# Patient Record
Sex: FEMALE | Race: WHITE | URBAN - METROPOLITAN AREA
[De-identification: names, ages, dates, MRNs, and addresses within clinical notes are randomized per-mention and may not be internally consistent; named-entity substitution may affect disease eponyms.]

---

## 2021-10-15 ENCOUNTER — TELEPHONE (OUTPATIENT)
Dept: PAIN MANAGEMENT | Age: 76
End: 2021-10-15

## 2021-10-15 NOTE — TELEPHONE ENCOUNTER
BENEFITS: [facility]    Insurance: MMO  Phone: [phone]  Contact Name: Alexandrea Bahena  Effective Date: 1.1.2021     Plan year: YES-CALENDAR  Deductible: 1000.00      Deductible Met: 1000.00  Allowed/benefits paid at: 80% AFTER DEDUCTIBLE  OOP: 3000.00 MET $1725.44  Freq Limits: 00750 & 95886-BASED ON MEDICAL NECESSITY  Prior Auth Requirement: NO    Notes: NO PRE-EX CLAUSE    Call Reference #: [id number]    Time of call: 10:20AM

## 2021-10-21 ENCOUNTER — OFFICE VISIT (OUTPATIENT)
Dept: PAIN MANAGEMENT | Age: 76
End: 2021-10-21
Payer: COMMERCIAL

## 2021-10-21 DIAGNOSIS — R20.0 NUMBNESS OF RIGHT HAND: Primary | ICD-10-CM

## 2021-10-21 PROCEDURE — 95886 MUSC TEST DONE W/N TEST COMP: CPT | Performed by: PHYSICAL MEDICINE & REHABILITATION

## 2021-10-21 PROCEDURE — 95911 NRV CNDJ TEST 9-10 STUDIES: CPT | Performed by: PHYSICAL MEDICINE & REHABILITATION

## 2021-10-21 NOTE — PROGRESS NOTES
Electromyography (EMG)/Nerve conduction studies (NCS) Report: Upper Extremities    Name: Marilyn De Oliveira   : 1945  Date: 10/21/2021   Physician: Simeon Irving MD        INDICATIONS: Marilyn De Oliveira is a 64 y.o. female who presents for electrodiagnostic evaluation for right carpal tunnel syndrome by request of Dr. Miracle Rosario. Her main symptom for evaluation is right hand numbness. She is right-handed. She confirms a history of thyroid disease. Both limbs are necessary to examine in order to evaluate for any evidence of systemic disease as well as establish normal baseline values from which to compare any abnormal unilateral findings. The study is explained and verbal consent to proceed is obtained. NERVE CONDUCTION STUDIES:  Sensory nerve conduction studies: Right median sensory nerve conduction study to the second digit demonstrates prolonged distal latency and diminished amplitude. Left median sensory nerve conduction study to the second digit demonstrates normal distal latency and diminished amplitude. Bilateral ulnar sensory nerve conduction studies to the fifth digit demonstrate normal distal latencies and amplitudes, waveform on the right is limited. Bilateral radial sensory nerve conduction studies to the base of the thumb demonstrate normal distal latencies and amplitudes. Bilateral upper limb temperatures are normal.     Motor nerve conduction studies: Right median motor nerve conduction study with pickup over the abductor pollicis brevis demonstrates prolonged distal latency and diminished amplitude. Left median motor nerve conduction study with pickup over the abductor pollicis brevis demonstrates normal distal latency and normal amplitude. Bilateral ulnar motor nerve conduction studies with pickup over the abductor digiti minimi demonstrate normal distal latencies and amplitudes.     ELECTROMYOGRAPHY: A disposable monopolar needle is used to evaluate bilateral deltoid, biceps, triceps, brachioradialis, extensor indicis proprius, first dorsal interosseous, and opponens pollicis. All of the muscles sampled are free of any increased insertional activity or any abnormal spontaneous activity. Motor unit recruitment is unremarkable. Bilateral mid cervical paraspinal muscle sampling is free of any increased insertional activity or any abnormal spontaneous activity. SUMMARY:  This study is abnormal:  1. There is current electrodiagnostic evidence for a bilateral median mononeuropathy at the wrist consistent with a clinical diagnosis of Bilateral carpal tunnel syndrome as clinically questioned. This is moderate on the right and mild on the left in degree by electrical criteria. There is no active denervation on electromyography bilaterally. 2. There is no current evidence for an active bilateral cervical motor radiculopathy or generalized large fiber sensorimotor peripheral polyneuropathy. RECOMMENDATIONS: The patient should follow up with Dr. Frances Gunter as previously instructed. If her symptoms persist or worsen, further electrodiagnostic evaluation may be considered if the patient is agreeable. Clinical correlation is recommended.

## 2021-12-11 LAB
SARS-COV-2, PCR: NOT DETECTED
SPECIMEN SOURCE: NORMAL

## 2023-08-16 PROBLEM — J31.0 RHINITIS: Status: ACTIVE | Noted: 2023-08-16

## 2023-08-16 PROBLEM — H61.23 HEARING LOSS OF BOTH EARS DUE TO CERUMEN IMPACTION: Status: ACTIVE | Noted: 2023-08-16

## 2023-08-16 PROBLEM — G56.01 CARPAL TUNNEL SYNDROME OF RIGHT WRIST: Status: ACTIVE | Noted: 2023-08-16

## 2023-08-16 PROBLEM — H61.23 BILATERAL IMPACTED CERUMEN: Status: ACTIVE | Noted: 2023-08-16

## 2023-08-16 PROBLEM — M19.90 OSTEOARTHRITIS: Status: ACTIVE | Noted: 2023-08-16

## 2023-08-16 PROBLEM — M62.89 MUSCLE TIGHTNESS: Status: ACTIVE | Noted: 2023-08-16

## 2023-08-16 PROBLEM — H93.13 TINNITUS OF BOTH EARS: Status: ACTIVE | Noted: 2023-08-16

## 2023-08-16 PROBLEM — F41.8 SITUATIONAL ANXIETY: Status: ACTIVE | Noted: 2023-08-16

## 2023-08-16 PROBLEM — E03.9 HYPOTHYROIDISM: Status: ACTIVE | Noted: 2023-08-16

## 2023-08-16 PROBLEM — J01.90: Status: ACTIVE | Noted: 2023-08-16

## 2023-08-16 PROBLEM — M79.651 MUSCULOSKELETAL PAIN OF RIGHT THIGH: Status: ACTIVE | Noted: 2023-08-16

## 2023-08-16 PROBLEM — R51.9 HEADACHE: Status: ACTIVE | Noted: 2023-08-16

## 2023-08-16 PROBLEM — M54.2 NECK PAIN: Status: ACTIVE | Noted: 2023-08-16

## 2023-08-16 PROBLEM — R20.9 ABNORMAL SENSATION OF UPPER EXTREMITY: Status: ACTIVE | Noted: 2023-08-16

## 2023-08-16 PROBLEM — G47.00 INSOMNIA: Status: ACTIVE | Noted: 2023-08-16

## 2023-08-16 PROBLEM — M76.892 PES ANSERINUS TENDINITIS OF LEFT LOWER EXTREMITY: Status: ACTIVE | Noted: 2023-08-16

## 2023-08-16 PROBLEM — J06.9 URI, ACUTE: Status: ACTIVE | Noted: 2023-08-16

## 2023-08-16 PROBLEM — J02.9 SORE THROAT: Status: ACTIVE | Noted: 2023-08-16

## 2023-08-16 PROBLEM — I10 HYPERTENSION: Status: ACTIVE | Noted: 2023-08-16

## 2023-08-16 PROBLEM — G43.909 MIGRAINES: Status: ACTIVE | Noted: 2023-08-16

## 2023-08-16 PROBLEM — M79.643 HAND PAIN: Status: ACTIVE | Noted: 2023-08-16

## 2023-08-16 PROBLEM — J33.9 NASAL POLYPS: Status: ACTIVE | Noted: 2023-08-16

## 2023-08-16 PROBLEM — L81.4 LENTIGO: Status: ACTIVE | Noted: 2023-08-16

## 2023-08-17 ENCOUNTER — OFFICE VISIT (OUTPATIENT)
Dept: PRIMARY CARE | Facility: CLINIC | Age: 78
End: 2023-08-17
Payer: MEDICARE

## 2023-08-17 VITALS
RESPIRATION RATE: 20 BRPM | HEIGHT: 62 IN | DIASTOLIC BLOOD PRESSURE: 60 MMHG | WEIGHT: 133 LBS | HEART RATE: 76 BPM | TEMPERATURE: 97.3 F | SYSTOLIC BLOOD PRESSURE: 132 MMHG | BODY MASS INDEX: 24.48 KG/M2

## 2023-08-17 DIAGNOSIS — I10 HYPERTENSION, UNSPECIFIED TYPE: ICD-10-CM

## 2023-08-17 DIAGNOSIS — E03.9 HYPOTHYROIDISM, UNSPECIFIED TYPE: ICD-10-CM

## 2023-08-17 DIAGNOSIS — R05.1 ACUTE COUGH: Primary | ICD-10-CM

## 2023-08-17 DIAGNOSIS — G43.809 OTHER MIGRAINE WITHOUT STATUS MIGRAINOSUS, NOT INTRACTABLE: ICD-10-CM

## 2023-08-17 PROCEDURE — 99213 OFFICE O/P EST LOW 20 MIN: CPT | Performed by: FAMILY MEDICINE

## 2023-08-17 RX ORDER — LEVOTHYROXINE SODIUM 125 UG/1
125 TABLET ORAL DAILY
COMMUNITY
End: 2023-08-17 | Stop reason: SDUPTHER

## 2023-08-17 RX ORDER — BUTALBITAL, ACETAMINOPHEN AND CAFFEINE 50; 325; 40 MG/1; MG/1; MG/1
1 TABLET ORAL EVERY 6 HOURS PRN
Qty: 60 TABLET | Refills: 1 | Status: SHIPPED | OUTPATIENT
Start: 2023-08-17 | End: 2023-11-29 | Stop reason: SDUPTHER

## 2023-08-17 RX ORDER — BUTALBITAL, ACETAMINOPHEN AND CAFFEINE 50; 325; 40 MG/1; MG/1; MG/1
TABLET ORAL
COMMUNITY
Start: 2022-10-18 | End: 2023-08-17 | Stop reason: SDUPTHER

## 2023-08-17 RX ORDER — LISINOPRIL 20 MG/1
20 TABLET ORAL DAILY
Qty: 90 TABLET | Refills: 3 | Status: SHIPPED | OUTPATIENT
Start: 2023-08-17

## 2023-08-17 RX ORDER — VIT C/E/ZN/COPPR/LUTEIN/ZEAXAN 250MG-90MG
CAPSULE ORAL
COMMUNITY

## 2023-08-17 RX ORDER — LEVOTHYROXINE SODIUM 125 UG/1
125 TABLET ORAL DAILY
Qty: 90 TABLET | Refills: 3 | Status: SHIPPED | OUTPATIENT
Start: 2023-08-17

## 2023-08-17 RX ORDER — LISINOPRIL 20 MG/1
1 TABLET ORAL DAILY
COMMUNITY
Start: 2020-08-31 | End: 2023-08-17 | Stop reason: SDUPTHER

## 2023-08-17 RX ORDER — PREDNISONE 20 MG/1
40 TABLET ORAL DAILY
Qty: 10 TABLET | Refills: 0 | Status: SHIPPED | OUTPATIENT
Start: 2023-08-17 | End: 2023-08-22

## 2023-08-17 RX ORDER — AZITHROMYCIN 250 MG/1
TABLET, FILM COATED ORAL
Qty: 6 TABLET | Refills: 0 | Status: SHIPPED | OUTPATIENT
Start: 2023-08-17 | End: 2023-08-22

## 2023-08-17 ASSESSMENT — ENCOUNTER SYMPTOMS
COUGH: 1
SHORTNESS OF BREATH: 0
ABDOMINAL PAIN: 0
HEADACHES: 1
FEVER: 0

## 2023-08-17 ASSESSMENT — PATIENT HEALTH QUESTIONNAIRE - PHQ9
1. LITTLE INTEREST OR PLEASURE IN DOING THINGS: NOT AT ALL
2. FEELING DOWN, DEPRESSED OR HOPELESS: NOT AT ALL
SUM OF ALL RESPONSES TO PHQ9 QUESTIONS 1 AND 2: 0

## 2023-08-17 NOTE — PROGRESS NOTES
OARRS:  No data recorded  Yes, I feel it is clincially indicated to continue the medication and have discussed with the patient risks/benefits/alternatives.    Is the patient prescribed a combination of a benzodiazepine and opioid?  Yes, I feel it is clincially indicated to continue the medication and have discussed with the patient risks/benefits/alternatives.    Last Urine Drug Screen / ordered today: No  Recent Results (from the past 81140 hour(s))   Drug Screen, Urine With Reflex to Confirmation    Collection Time: 07/14/22  1:32 PM   Result Value Ref Range    DRUG SCREEN COMMENT URINE SEE BELOW     Amphetamine Screen, Urine PRESUMPTIVE NEGATIVE NEGATIVE    Barbiturate Screen, Urine PRESUMPTIVE NEGATIVE NEGATIVE    BENZODIAZEPINE (PRESENCE) IN URINE BY SCREEN METHOD PRESUMPTIVE NEGATIVE NEGATIVE    Cannabinoid Screen, Urine PRESUMPTIVE NEGATIVE NEGATIVE    Cocaine Screen, Urine PRESUMPTIVE NEGATIVE NEGATIVE    Fentanyl, Ur PRESUMPTIVE NEGATIVE NEGATIVE    Methadone Screen, Urine PRESUMPTIVE NEGATIVE NEGATIVE    Opiate Screen, Urine PRESUMPTIVE NEGATIVE NEGATIVE    Oxycodone Screen, Ur PRESUMPTIVE NEGATIVE NEGATIVE    PCP Screen, Urine PRESUMPTIVE NEGATIVE NEGATIVE     Results are as expected.     Controlled Substance Agreement:  Date of the Last Agreement: 8/17/23  Reviewed Controlled Substance Agreement including but not limited to the benefits, risks, and alternatives to treatment with a Controlled Substance medication(s).    Barbiturates:   What is the patient's goal of therapy? Help with HA  Is this being achieved with current treatment? yes    Activities of Daily Living:   Is your overall impression that this patient is benefiting (symptom reduction outweighs side effects) from barbiturate therapy? Yes     1. Physical Functioning: Better  2. Family Relationship: Better  3. Social Relationship: Better  4. Mood: Better  5. Sleep Patterns: Better  6. Overall Function: Better  Subjective   Patient ID: Manasa  "Manasa is a 78 y.o. female who presents for Cough (Cough for 2.5wks, no other symptoms. Cough is wet but not productive. /Pt also asking for RF of Migraine med (CSA signed)).    HPI     Review of Systems   Constitutional:  Negative for fever.   Respiratory:  Positive for cough. Negative for shortness of breath.    Cardiovascular:  Negative for chest pain.   Gastrointestinal:  Negative for abdominal pain.   Skin:  Negative for rash.   Neurological:  Positive for headaches.       Objective   /60   Pulse 76   Temp 36.3 °C (97.3 °F)   Resp 20   Ht 1.575 m (5' 2\")   Wt 60.3 kg (133 lb)   BMI 24.33 kg/m²     Physical Exam  Constitutional:       Appearance: Normal appearance.   HENT:      Head: Normocephalic.   Eyes:      Conjunctiva/sclera: Conjunctivae normal.   Cardiovascular:      Rate and Rhythm: Normal rate and regular rhythm.   Pulmonary:      Effort: Pulmonary effort is normal.      Breath sounds: Normal breath sounds.   Musculoskeletal:      Cervical back: Neck supple.   Skin:     General: Skin is warm and dry.   Neurological:      Mental Status: She is alert.       Assessment/Plan   Problem List Items Addressed This Visit       Hypertension    Relevant Medications    lisinopril 20 mg tablet    Hypothyroidism    Relevant Medications    levothyroxine (Synthroid, Levoxyl) 125 mcg tablet    Migraines    Relevant Medications    butalbital-acetaminophen-caff -40 mg tablet    predniSONE (Deltasone) 20 mg tablet     Other Visit Diagnoses       Acute cough    -  Primary    Relevant Medications    azithromycin (Zithromax) 250 mg tablet               "

## 2023-08-23 ENCOUNTER — TELEPHONE (OUTPATIENT)
Dept: PRIMARY CARE | Facility: CLINIC | Age: 78
End: 2023-08-23
Payer: MEDICARE

## 2023-08-23 DIAGNOSIS — R05.1 ACUTE COUGH: Primary | ICD-10-CM

## 2023-08-23 RX ORDER — BENZONATATE 200 MG/1
200 CAPSULE ORAL 3 TIMES DAILY PRN
Qty: 42 CAPSULE | Refills: 0 | Status: SHIPPED | OUTPATIENT
Start: 2023-08-23 | End: 2023-09-22

## 2023-08-23 NOTE — TELEPHONE ENCOUNTER
Pt called and said she was in last week and was put on Azithromycin. She said she is feeling better but does still have a bad cough. Suggestions?

## 2023-11-29 ENCOUNTER — OFFICE VISIT (OUTPATIENT)
Dept: PRIMARY CARE | Facility: CLINIC | Age: 78
End: 2023-11-29
Payer: MEDICARE

## 2023-11-29 VITALS
HEART RATE: 67 BPM | HEIGHT: 62 IN | BODY MASS INDEX: 24.11 KG/M2 | SYSTOLIC BLOOD PRESSURE: 122 MMHG | DIASTOLIC BLOOD PRESSURE: 76 MMHG | WEIGHT: 131 LBS | TEMPERATURE: 97.5 F | RESPIRATION RATE: 17 BRPM

## 2023-11-29 DIAGNOSIS — E03.9 HYPOTHYROIDISM, UNSPECIFIED TYPE: ICD-10-CM

## 2023-11-29 DIAGNOSIS — Z13.220 LIPID SCREENING: ICD-10-CM

## 2023-11-29 DIAGNOSIS — G43.809 OTHER MIGRAINE WITHOUT STATUS MIGRAINOSUS, NOT INTRACTABLE: ICD-10-CM

## 2023-11-29 DIAGNOSIS — D64.9 ANEMIA, UNSPECIFIED TYPE: ICD-10-CM

## 2023-11-29 DIAGNOSIS — Z00.00 MEDICARE ANNUAL WELLNESS VISIT, SUBSEQUENT: Primary | ICD-10-CM

## 2023-11-29 DIAGNOSIS — Z00.00 WELL ADULT EXAM: ICD-10-CM

## 2023-11-29 PROCEDURE — 3078F DIAST BP <80 MM HG: CPT | Performed by: FAMILY MEDICINE

## 2023-11-29 PROCEDURE — 1036F TOBACCO NON-USER: CPT | Performed by: FAMILY MEDICINE

## 2023-11-29 PROCEDURE — 3074F SYST BP LT 130 MM HG: CPT | Performed by: FAMILY MEDICINE

## 2023-11-29 PROCEDURE — G0439 PPPS, SUBSEQ VISIT: HCPCS | Performed by: FAMILY MEDICINE

## 2023-11-29 PROCEDURE — 1170F FXNL STATUS ASSESSED: CPT | Performed by: FAMILY MEDICINE

## 2023-11-29 PROCEDURE — 1159F MED LIST DOCD IN RCRD: CPT | Performed by: FAMILY MEDICINE

## 2023-11-29 PROCEDURE — 99214 OFFICE O/P EST MOD 30 MIN: CPT | Performed by: FAMILY MEDICINE

## 2023-11-29 RX ORDER — BUTALBITAL, ACETAMINOPHEN AND CAFFEINE 50; 325; 40 MG/1; MG/1; MG/1
1 TABLET ORAL EVERY 6 HOURS PRN
Qty: 60 TABLET | Refills: 1 | Status: SHIPPED | OUTPATIENT
Start: 2023-11-29 | End: 2024-05-30 | Stop reason: SDUPTHER

## 2023-11-29 ASSESSMENT — PATIENT HEALTH QUESTIONNAIRE - PHQ9
SUM OF ALL RESPONSES TO PHQ9 QUESTIONS 1 AND 2: 0
1. LITTLE INTEREST OR PLEASURE IN DOING THINGS: NOT AT ALL
2. FEELING DOWN, DEPRESSED OR HOPELESS: NOT AT ALL

## 2023-11-29 ASSESSMENT — ACTIVITIES OF DAILY LIVING (ADL)
DRESSING: INDEPENDENT
DOING_HOUSEWORK: INDEPENDENT
MANAGING_FINANCES: INDEPENDENT
BATHING: INDEPENDENT
TAKING_MEDICATION: INDEPENDENT
GROCERY_SHOPPING: INDEPENDENT

## 2023-11-29 NOTE — PROGRESS NOTES
OARRS:  No data recorded  I have personally reviewed the OARRS report for Manasa Goel. I have considered the risks of abuse, dependence, addiction and diversion    Is the patient prescribed a combination of a benzodiazepine and opioid?  No    Last Urine Drug Screen / ordered today: No  No results found for this or any previous visit (from the past 8760 hour(s)).  N/A    Clinical rationale for not completing a Urine Drug Screen: rasre use of butabital      Controlled Substance Agreement:  Date of the Last Agreement: yes  Reviewed Controlled Substance Agreement including but not limited to the benefits, risks, and alternatives to treatment with a Controlled Substance medication(s).    Barbiturates:   What is the patient's goal of therapy? Help with HA  Is this being achieved with current treatment? yes    Activities of Daily Living:   Is your overall impression that this patient is benefiting (symptom reduction outweighs side effects) from barbiturate therapy? Yes     1. Physical Functioning: Better  2. Family Relationship: Better  3. Social Relationship: Better  4. Mood: Better  5. Sleep Patterns: Better  6. Overall Function: Better  Subjective   Reason for Visit: Manasa Goel is an 78 y.o. female here for a Medicare Wellness visit.     Past Medical, Surgical, and Family History reviewed and updated in chart.         HPI    Patient Care Team:  Marcelino Hammer DO as PCP - General  Marcelino Hammer DO as PCP - [facility] Attributed Provider     Review of Systems   Constitutional: Negative.    HENT: Negative.     Eyes: Negative.    Respiratory: Negative.     Cardiovascular: Negative.    Gastrointestinal: Negative.    Endocrine: Negative.    Genitourinary: Negative.    Musculoskeletal: Negative.    Skin: Negative.    Allergic/Immunologic: Negative.    Neurological:  Positive for headaches.   Hematological: Negative.    Psychiatric/Behavioral: Negative.         Objective   Vitals:  /76   Pulse 67   Temp 36.4 °C  "(97.5 °F)   Resp 17   Ht 1.575 m (5' 2\")   Wt 59.4 kg (131 lb)   BMI 23.96 kg/m²       Physical Exam  Vitals and nursing note reviewed.   Constitutional:       Appearance: Normal appearance.   HENT:      Head: Normocephalic and atraumatic.      Nose: Nose normal.      Mouth/Throat:      Pharynx: Oropharynx is clear.   Eyes:      Extraocular Movements: Extraocular movements intact.      Conjunctiva/sclera: Conjunctivae normal.      Pupils: Pupils are equal, round, and reactive to light.   Neck:      Vascular: No carotid bruit.   Cardiovascular:      Rate and Rhythm: Normal rate and regular rhythm.      Pulses: Normal pulses.      Heart sounds: Normal heart sounds.   Pulmonary:      Effort: Pulmonary effort is normal. No respiratory distress.      Breath sounds: Normal breath sounds. No wheezing, rhonchi or rales.   Abdominal:      General: Abdomen is flat. Bowel sounds are normal. There is no distension.      Palpations: Abdomen is soft.      Tenderness: There is no abdominal tenderness.      Hernia: No hernia is present.   Musculoskeletal:         General: No swelling or tenderness. Normal range of motion.      Cervical back: Normal range of motion and neck supple. No tenderness.   Lymphadenopathy:      Cervical: No cervical adenopathy.   Skin:     General: Skin is warm and dry.      Capillary Refill: Capillary refill takes less than 2 seconds.   Neurological:      General: No focal deficit present.      Mental Status: She is alert and oriented to person, place, and time.      Cranial Nerves: No cranial nerve deficit.   Psychiatric:         Attention and Perception: Attention and perception normal.         Mood and Affect: Mood normal.         Behavior: Behavior normal.         Thought Content: Thought content normal.         Judgment: Judgment normal.         Assessment/Plan   Problem List Items Addressed This Visit       Hypothyroidism    Migraines    Relevant Medications    butalbital-acetaminophen-caff " -40 mg tablet     Other Visit Diagnoses       Medicare annual wellness visit, subsequent    -  Primary    Well adult exam        Relevant Orders    CBC and Auto Differential (Completed)    Comprehensive Metabolic Panel (Completed)    Magnesium (Completed)    Lipid Panel (Completed)    Lipid screening        Relevant Orders    CBC and Auto Differential (Completed)    Comprehensive Metabolic Panel (Completed)    Magnesium (Completed)    Lipid Panel (Completed)    Anemia, unspecified type        Relevant Orders    CBC and Auto Differential (Completed)

## 2023-11-30 ENCOUNTER — LAB (OUTPATIENT)
Dept: LAB | Facility: LAB | Age: 78
End: 2023-11-30
Payer: MEDICARE

## 2023-11-30 DIAGNOSIS — D64.9 ANEMIA, UNSPECIFIED TYPE: ICD-10-CM

## 2023-11-30 DIAGNOSIS — Z00.00 WELL ADULT EXAM: ICD-10-CM

## 2023-11-30 DIAGNOSIS — Z13.220 LIPID SCREENING: ICD-10-CM

## 2023-11-30 LAB
ALBUMIN SERPL BCP-MCNC: 4.5 G/DL (ref 3.4–5)
ALP SERPL-CCNC: 91 U/L (ref 33–136)
ALT SERPL W P-5'-P-CCNC: 20 U/L (ref 7–45)
ANION GAP SERPL CALC-SCNC: 13 MMOL/L (ref 10–20)
AST SERPL W P-5'-P-CCNC: 19 U/L (ref 9–39)
BASOPHILS # BLD AUTO: 0.04 X10*3/UL (ref 0–0.1)
BASOPHILS NFR BLD AUTO: 0.5 %
BILIRUB SERPL-MCNC: 0.3 MG/DL (ref 0–1.2)
BUN SERPL-MCNC: 29 MG/DL (ref 6–23)
CALCIUM SERPL-MCNC: 10.4 MG/DL (ref 8.6–10.3)
CHLORIDE SERPL-SCNC: 106 MMOL/L (ref 98–107)
CHOLEST SERPL-MCNC: 227 MG/DL (ref 0–199)
CHOLESTEROL/HDL RATIO: 4.2
CO2 SERPL-SCNC: 30 MMOL/L (ref 21–32)
CREAT SERPL-MCNC: 0.8 MG/DL (ref 0.5–1.05)
EOSINOPHIL # BLD AUTO: 0.35 X10*3/UL (ref 0–0.4)
EOSINOPHIL NFR BLD AUTO: 4.7 %
ERYTHROCYTE [DISTWIDTH] IN BLOOD BY AUTOMATED COUNT: 12.7 % (ref 11.5–14.5)
GFR SERPL CREATININE-BSD FRML MDRD: 75 ML/MIN/1.73M*2
GLUCOSE SERPL-MCNC: 92 MG/DL (ref 74–99)
HCT VFR BLD AUTO: 40.6 % (ref 36–46)
HDLC SERPL-MCNC: 54 MG/DL
HGB BLD-MCNC: 13.4 G/DL (ref 12–16)
IMM GRANULOCYTES # BLD AUTO: 0.02 X10*3/UL (ref 0–0.5)
IMM GRANULOCYTES NFR BLD AUTO: 0.3 % (ref 0–0.9)
LDLC SERPL CALC-MCNC: 147 MG/DL
LYMPHOCYTES # BLD AUTO: 1.89 X10*3/UL (ref 0.8–3)
LYMPHOCYTES NFR BLD AUTO: 25.3 %
MAGNESIUM SERPL-MCNC: 2.37 MG/DL (ref 1.6–2.4)
MCH RBC QN AUTO: 32.9 PG (ref 26–34)
MCHC RBC AUTO-ENTMCNC: 33 G/DL (ref 32–36)
MCV RBC AUTO: 100 FL (ref 80–100)
MONOCYTES # BLD AUTO: 0.59 X10*3/UL (ref 0.05–0.8)
MONOCYTES NFR BLD AUTO: 7.9 %
NEUTROPHILS # BLD AUTO: 4.57 X10*3/UL (ref 1.6–5.5)
NEUTROPHILS NFR BLD AUTO: 61.3 %
NON HDL CHOLESTEROL: 173 MG/DL (ref 0–149)
NRBC BLD-RTO: 0 /100 WBCS (ref 0–0)
PLATELET # BLD AUTO: 288 X10*3/UL (ref 150–450)
POTASSIUM SERPL-SCNC: 4.2 MMOL/L (ref 3.5–5.3)
PROT SERPL-MCNC: 7.2 G/DL (ref 6.4–8.2)
RBC # BLD AUTO: 4.07 X10*6/UL (ref 4–5.2)
SODIUM SERPL-SCNC: 145 MMOL/L (ref 136–145)
TRIGL SERPL-MCNC: 131 MG/DL (ref 0–149)
VLDL: 26 MG/DL (ref 0–40)
WBC # BLD AUTO: 7.5 X10*3/UL (ref 4.4–11.3)

## 2023-11-30 PROCEDURE — 83735 ASSAY OF MAGNESIUM: CPT

## 2023-11-30 PROCEDURE — 80061 LIPID PANEL: CPT

## 2023-11-30 PROCEDURE — 80053 COMPREHEN METABOLIC PANEL: CPT

## 2023-11-30 PROCEDURE — 85025 COMPLETE CBC W/AUTO DIFF WBC: CPT

## 2023-11-30 PROCEDURE — 36415 COLL VENOUS BLD VENIPUNCTURE: CPT

## 2023-12-01 ASSESSMENT — ENCOUNTER SYMPTOMS
GASTROINTESTINAL NEGATIVE: 1
ALLERGIC/IMMUNOLOGIC NEGATIVE: 1
MUSCULOSKELETAL NEGATIVE: 1
CONSTITUTIONAL NEGATIVE: 1
CARDIOVASCULAR NEGATIVE: 1
HEADACHES: 1
RESPIRATORY NEGATIVE: 1
ENDOCRINE NEGATIVE: 1
HEMATOLOGIC/LYMPHATIC NEGATIVE: 1
EYES NEGATIVE: 1
PSYCHIATRIC NEGATIVE: 1

## 2024-02-06 ENCOUNTER — OFFICE VISIT (OUTPATIENT)
Dept: PRIMARY CARE | Facility: CLINIC | Age: 79
End: 2024-02-06
Payer: MEDICARE

## 2024-02-06 VITALS
DIASTOLIC BLOOD PRESSURE: 64 MMHG | TEMPERATURE: 97.5 F | BODY MASS INDEX: 24.33 KG/M2 | SYSTOLIC BLOOD PRESSURE: 112 MMHG | HEART RATE: 61 BPM | WEIGHT: 133 LBS | RESPIRATION RATE: 17 BRPM

## 2024-02-06 DIAGNOSIS — M19.90 ARTHRITIS: Primary | ICD-10-CM

## 2024-02-06 PROCEDURE — 99213 OFFICE O/P EST LOW 20 MIN: CPT | Performed by: FAMILY MEDICINE

## 2024-02-06 RX ORDER — CELECOXIB 200 MG/1
200 CAPSULE ORAL DAILY
Qty: 30 CAPSULE | Refills: 5 | Status: SHIPPED | OUTPATIENT
Start: 2024-02-06 | End: 2024-08-04

## 2024-02-06 ASSESSMENT — ENCOUNTER SYMPTOMS
ABDOMINAL PAIN: 0
MYALGIAS: 1
ARTHRALGIAS: 1
NUMBNESS: 0
SHORTNESS OF BREATH: 0
FEVER: 0

## 2024-02-06 NOTE — PROGRESS NOTES
Subjective   Patient ID: Manasa Goel is a 78 y.o. female who presents for Wrist Pain.  Wrist Pain   The pain is present in the right wrist and right hand. This is a new problem. The current episode started in the past 7 days. There has been no history of extremity trauma. The problem occurs constantly. The problem has been gradually worsening. The quality of the pain is described as burning and aching. The pain is at a severity of 9/10. The pain is severe. Pertinent negatives include no fever, joint swelling or numbness. Associated symptoms comments: Hot to the touch. Treatments tried: hand brace. The treatment provided mild relief.       Review of Systems   Constitutional:  Negative for fever.   Respiratory:  Negative for shortness of breath.    Cardiovascular:  Negative for chest pain.   Gastrointestinal:  Negative for abdominal pain.   Musculoskeletal:  Positive for arthralgias and myalgias.   Skin:  Negative for rash.   Neurological:  Negative for numbness.       Objective   Physical Exam  Musculoskeletal:         General: Tenderness present.      Right hand: Tenderness and bony tenderness present. Decreased range of motion.        Arms:          Assessment/Plan   Problem List Items Addressed This Visit    None  Visit Diagnoses         Codes    Arthritis    -  Primary M19.90    Relevant Medications    celecoxib (CeleBREX) 200 mg capsule                 Ilda Amaya CMA 02/06/24 10:15 AM

## 2024-02-21 ENCOUNTER — TELEPHONE (OUTPATIENT)
Dept: PRIMARY CARE | Facility: CLINIC | Age: 79
End: 2024-02-21
Payer: MEDICARE

## 2024-02-21 DIAGNOSIS — M19.90 ARTHRITIS: Primary | ICD-10-CM

## 2024-02-21 DIAGNOSIS — M79.641 PAIN IN BOTH HANDS: ICD-10-CM

## 2024-02-21 DIAGNOSIS — M79.642 PAIN IN BOTH HANDS: ICD-10-CM

## 2024-02-21 NOTE — TELEPHONE ENCOUNTER
Pt called dorian said with the Celebrex her hand pain did seem to improve but she has noticed recently pain has increased and is worsening. Suggestions?

## 2024-03-12 ENCOUNTER — HOSPITAL ENCOUNTER (OUTPATIENT)
Dept: RADIOLOGY | Facility: CLINIC | Age: 79
Discharge: HOME | End: 2024-03-12
Payer: MEDICARE

## 2024-03-12 ENCOUNTER — OFFICE VISIT (OUTPATIENT)
Dept: ORTHOPEDIC SURGERY | Facility: CLINIC | Age: 79
End: 2024-03-12
Payer: MEDICARE

## 2024-03-12 DIAGNOSIS — M19.90 ARTHRITIS: ICD-10-CM

## 2024-03-12 DIAGNOSIS — M79.641 RIGHT HAND PAIN: ICD-10-CM

## 2024-03-12 DIAGNOSIS — M79.642 PAIN IN BOTH HANDS: ICD-10-CM

## 2024-03-12 DIAGNOSIS — M79.641 PAIN IN BOTH HANDS: ICD-10-CM

## 2024-03-12 PROCEDURE — 1036F TOBACCO NON-USER: CPT | Performed by: STUDENT IN AN ORGANIZED HEALTH CARE EDUCATION/TRAINING PROGRAM

## 2024-03-12 PROCEDURE — 73130 X-RAY EXAM OF HAND: CPT | Mod: RT

## 2024-03-12 PROCEDURE — 1157F ADVNC CARE PLAN IN RCRD: CPT | Performed by: STUDENT IN AN ORGANIZED HEALTH CARE EDUCATION/TRAINING PROGRAM

## 2024-03-12 PROCEDURE — 73130 X-RAY EXAM OF HAND: CPT | Mod: RIGHT SIDE | Performed by: RADIOLOGY

## 2024-03-12 PROCEDURE — 1159F MED LIST DOCD IN RCRD: CPT | Performed by: STUDENT IN AN ORGANIZED HEALTH CARE EDUCATION/TRAINING PROGRAM

## 2024-03-12 PROCEDURE — 99204 OFFICE O/P NEW MOD 45 MIN: CPT | Performed by: STUDENT IN AN ORGANIZED HEALTH CARE EDUCATION/TRAINING PROGRAM

## 2024-03-12 PROCEDURE — 20605 DRAIN/INJ JOINT/BURSA W/O US: CPT | Performed by: STUDENT IN AN ORGANIZED HEALTH CARE EDUCATION/TRAINING PROGRAM

## 2024-03-12 PROCEDURE — 20600 DRAIN/INJ JOINT/BURSA W/O US: CPT | Performed by: STUDENT IN AN ORGANIZED HEALTH CARE EDUCATION/TRAINING PROGRAM

## 2024-03-12 PROCEDURE — L3924 HFO WITHOUT JOINTS PRE OTS: HCPCS | Performed by: STUDENT IN AN ORGANIZED HEALTH CARE EDUCATION/TRAINING PROGRAM

## 2024-03-12 RX ORDER — LIDOCAINE HYDROCHLORIDE 10 MG/ML
0.5 INJECTION INFILTRATION; PERINEURAL
Status: COMPLETED | OUTPATIENT
Start: 2024-03-12 | End: 2024-03-12

## 2024-03-12 RX ORDER — LIDOCAINE HYDROCHLORIDE 10 MG/ML
1 INJECTION INFILTRATION; PERINEURAL
Status: COMPLETED | OUTPATIENT
Start: 2024-03-12 | End: 2024-03-12

## 2024-03-12 RX ADMIN — LIDOCAINE HYDROCHLORIDE 0.5 ML: 10 INJECTION INFILTRATION; PERINEURAL at 09:03

## 2024-03-12 RX ADMIN — LIDOCAINE HYDROCHLORIDE 1 ML: 10 INJECTION INFILTRATION; PERINEURAL at 09:03

## 2024-03-12 NOTE — PROGRESS NOTES
History of Present Illness:  Presents for evaluation of right wrist and thumb.  Patient denies inciting trauma.  The pain is localized to the base of the thumb and dorsal wrist.  It is described as moderate. The pain occurs intermittantly. The patient presents due to persistent symptoms even with activity modification.      Review of Systems   GENERAL: Negative for malaise, significant weight loss, fever  MUSCULOSKELETAL: see HPI  NEURO:  Negative    The patient's past medical history, family history, social history, and review of systems were reviewed. History is otherwise negative except as stated in the HPI.    Physical Examination:  General: Alert and oriented to person, place, and time.  No acute distress and breathing comfortably: Pleasant and cooperative with examination.  HEENT: Head is normocephalic and atraumatic.  Neck: Supple, no visible swelling.  Cardiovascular: No palpable tachycardia  Lungs: No audible wheezing or labored breathing  Abdomen: Nondistended.  On musculoskeletal examination, the patient has full elbow range of motion. In regards to the wrist, there is no obvious deformity. Range of motion is full in flexion, extension, pronation, and supination. Strength is 5/5 in flexion and extension. There is tenderness to palpation of the right dorsal radiocarpal joint.  Additional tenderness over the thumb CMC.  There is no tenderness to palpation about  1st dorsal compartment, the SL interval, or the TFCC. Sensation and motor function are intact in the radial, ulnar, and median nerve distribution. There is no obvious thenar or intrinsic atrophy. All fingers are without triggering and are without pain over the A1 pulley. The patient can make a full composite fist. The hand itself is warm and well perfused. The skin is intact throughout. The contralateral hand and wrist are normal to inspection, range of motion, stability, and strength.    Imaging:  AP, lateral, and oblique radiographs of the right  wrist were reviewed. These reveal thumb CMC and wrist arthritis.    S Inj/Asp: R thumb CMC on 3/12/2024 9:03 AM  Indications: pain  Details: 24 G needle, plantar approach  Medications: 5 mg triamcinolone acetonide 10 mg/mL; 0.5 mL lidocaine 10 mg/mL (1 %)  Outcome: tolerated well, no immediate complications  Procedure, treatment alternatives, risks and benefits explained, specific risks discussed. Consent was given by the patient. Immediately prior to procedure a time out was called to verify the correct patient, procedure, equipment, support staff and site/side marked as required. Patient was prepped and draped in the usual sterile fashion.       M Inj/Asp: R radiocarpal on 3/12/2024 9:03 AM  Indications: pain  Details: 24 G needle, volar approach  Medications: 10 mg triamcinolone acetonide 10 mg/mL; 1 mL lidocaine 10 mg/mL (1 %)  Outcome: tolerated well, no immediate complications  Procedure, treatment alternatives, risks and benefits explained, specific risks discussed. Consent was given by the patient. Immediately prior to procedure a time out was called to verify the correct patient, procedure, equipment, support staff and site/side marked as required. Patient was prepped and draped in the usual sterile fashion.               Assessment:  Patient with thumb CMC and wrist arthritis.  Recommend Comfort Cool and cortisone injection into thumb CMC and wrist joint.  Discussed activity modification and brace wear.    Plan:  Right thumb cmc and radiocarpal Injection. I had a long discussion with the patient regarding the diagnosis of hand/wrist arthritis and the risks/benefits/expected outcomes of various treatment options. At this point, the patient elected non-operative treatment consisting of activity modification, intermittant NSAIDs (if not medically contraindicated), and/or  splinting. I also discussed the option of a corticosteroid injection. Specifically, I reviewed the risks of injection which include, but  are not limited to, infection, bleeding, pain, steroid flare, glycemic alteration, subcutaneous fat atrophy, skin hypopigmentation, soft tissue damage, and incomplete symptom relief. The patient consented to the injection, and then using sterile technique, I injected a 1mL of Kenalog 40 into the thumb CMC and radiocarpal. The injection site was dressed, and the patient tolerated the injection well. Finally, I have emphasized patience, as any benefit may take some time to manifest. Depending on the success of this non-operative course, I will see them back on an as needed basis.        Follow up 3 months    Marie Harp MD  Orthopaedic Surgeon

## 2024-05-30 DIAGNOSIS — G43.809 OTHER MIGRAINE WITHOUT STATUS MIGRAINOSUS, NOT INTRACTABLE: ICD-10-CM

## 2024-05-30 RX ORDER — BUTALBITAL, ACETAMINOPHEN AND CAFFEINE 50; 325; 40 MG/1; MG/1; MG/1
1 TABLET ORAL EVERY 6 HOURS PRN
Qty: 60 TABLET | Refills: 3 | Status: SHIPPED | OUTPATIENT
Start: 2024-05-30

## 2024-05-30 NOTE — TELEPHONE ENCOUNTER
Patient requests prescription below    Last Office Visit: 2/6/2024   Next Office Visit: Visit date not found     Requested Prescriptions     Pending Prescriptions Disp Refills    butalbital-acetaminophen-caff -40 mg tablet 60 tablet 1     Sig: Take 1 tablet by mouth every 6 hours if needed for headaches.

## 2024-06-04 ENCOUNTER — TELEPHONE (OUTPATIENT)
Dept: PRIMARY CARE | Facility: CLINIC | Age: 79
End: 2024-06-04
Payer: MEDICARE

## 2024-06-04 DIAGNOSIS — M19.90 ARTHRITIS: Primary | ICD-10-CM

## 2024-06-18 ENCOUNTER — APPOINTMENT (OUTPATIENT)
Dept: ORTHOPEDIC SURGERY | Facility: CLINIC | Age: 79
End: 2024-06-18
Payer: MEDICARE

## 2024-06-18 DIAGNOSIS — M79.642 PAIN IN BOTH HANDS: Primary | ICD-10-CM

## 2024-06-18 DIAGNOSIS — M79.641 RIGHT HAND PAIN: ICD-10-CM

## 2024-06-18 DIAGNOSIS — M79.641 PAIN IN BOTH HANDS: Primary | ICD-10-CM

## 2024-06-18 PROCEDURE — 1036F TOBACCO NON-USER: CPT | Performed by: STUDENT IN AN ORGANIZED HEALTH CARE EDUCATION/TRAINING PROGRAM

## 2024-06-18 PROCEDURE — 99213 OFFICE O/P EST LOW 20 MIN: CPT | Performed by: STUDENT IN AN ORGANIZED HEALTH CARE EDUCATION/TRAINING PROGRAM

## 2024-06-18 PROCEDURE — 1157F ADVNC CARE PLAN IN RCRD: CPT | Performed by: STUDENT IN AN ORGANIZED HEALTH CARE EDUCATION/TRAINING PROGRAM

## 2024-06-18 PROCEDURE — 1159F MED LIST DOCD IN RCRD: CPT | Performed by: STUDENT IN AN ORGANIZED HEALTH CARE EDUCATION/TRAINING PROGRAM

## 2024-06-18 NOTE — PROGRESS NOTES
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  Injection 3 months ago with complete relief.  Now has minor pain but this is tolerable.  Much better than prior.     Physical Examination:  Right upper extremity:  The patient appears to be their stated age, is in no apparent distress, and is oriented x3. The patients mood and affect are appropriate. The patients gait is normal. The examination of the limb in question was performed in comparison to the contralateral limb.    On musculoskeletal examination, minimal tenderness over the right thumb CMC.  No pain over the dorsal wrist.    Sensation and motor function are intact in the radial, and median nerve distribution. There is no obvious thenar atrophy, and thenar strength is 5/5. There is no intrinsic atrophy, and intrinsic strength is 5/5.  The patient can make a full composite fist. The hand itself is warm and well perfused. The skin is intact throughout. The contralateral hand/wrist are normal to inspection, range of motion, stability, and strength.        Assessment:  Patient with improved right thumb and wrist pain.  At this point we will hold off on further cortisone injections.  Continue bracing and anti-inflammatories as needed.    Plan:   Continue bracing and anti-inflammatories as needed.  Follow-up as needed.    Marie Harp MD

## 2024-06-24 ENCOUNTER — OFFICE VISIT (OUTPATIENT)
Dept: PRIMARY CARE | Facility: CLINIC | Age: 79
End: 2024-06-24
Payer: MEDICARE

## 2024-06-24 VITALS
TEMPERATURE: 98.8 F | WEIGHT: 132 LBS | OXYGEN SATURATION: 98 % | DIASTOLIC BLOOD PRESSURE: 80 MMHG | SYSTOLIC BLOOD PRESSURE: 140 MMHG | HEART RATE: 87 BPM | RESPIRATION RATE: 18 BRPM | BODY MASS INDEX: 24.14 KG/M2

## 2024-06-24 DIAGNOSIS — J06.9 UPPER RESPIRATORY TRACT INFECTION, UNSPECIFIED TYPE: ICD-10-CM

## 2024-06-24 DIAGNOSIS — R05.9 COUGH, UNSPECIFIED TYPE: Primary | ICD-10-CM

## 2024-06-24 DIAGNOSIS — U07.1 COVID-19: ICD-10-CM

## 2024-06-24 LAB — POC SARS-COV-2 AG BINAX: ABNORMAL

## 2024-06-24 PROCEDURE — 87811 SARS-COV-2 COVID19 W/OPTIC: CPT | Performed by: NURSE PRACTITIONER

## 2024-06-24 PROCEDURE — 1159F MED LIST DOCD IN RCRD: CPT | Performed by: NURSE PRACTITIONER

## 2024-06-24 PROCEDURE — 1157F ADVNC CARE PLAN IN RCRD: CPT | Performed by: NURSE PRACTITIONER

## 2024-06-24 PROCEDURE — 3079F DIAST BP 80-89 MM HG: CPT | Performed by: NURSE PRACTITIONER

## 2024-06-24 PROCEDURE — 1036F TOBACCO NON-USER: CPT | Performed by: NURSE PRACTITIONER

## 2024-06-24 PROCEDURE — 1160F RVW MEDS BY RX/DR IN RCRD: CPT | Performed by: NURSE PRACTITIONER

## 2024-06-24 PROCEDURE — 3077F SYST BP >= 140 MM HG: CPT | Performed by: NURSE PRACTITIONER

## 2024-06-24 PROCEDURE — 99213 OFFICE O/P EST LOW 20 MIN: CPT | Performed by: NURSE PRACTITIONER

## 2024-06-24 RX ORDER — FLUTICASONE PROPIONATE 50 MCG
1 SPRAY, SUSPENSION (ML) NASAL DAILY
Qty: 16 G | Refills: 0 | Status: SHIPPED | OUTPATIENT
Start: 2024-06-24 | End: 2024-07-24

## 2024-06-24 RX ORDER — DOXYCYCLINE 100 MG/1
100 CAPSULE ORAL 2 TIMES DAILY
Qty: 20 CAPSULE | Refills: 0 | Status: SHIPPED | OUTPATIENT
Start: 2024-06-24 | End: 2024-07-04

## 2024-06-24 ASSESSMENT — ENCOUNTER SYMPTOMS
SORE THROAT: 0
HEADACHES: 1
CHEST TIGHTNESS: 0
SINUS PAIN: 0
VOMITING: 0
RHINORRHEA: 1
WHEEZING: 1
CHILLS: 1
COUGH: 1
SHORTNESS OF BREATH: 0
NAUSEA: 0
DIARRHEA: 0
SINUS PRESSURE: 0
APPETITE CHANGE: 0
FATIGUE: 0
MYALGIAS: 0

## 2024-06-24 NOTE — PROGRESS NOTES
Subjective   Patient ID: Manasa Goel is a 78 y.o. female who presents for Cough.    Symptoms started over a week ago with cough and nasal congestion. Patient does not have a sore throat. Pt has a headache. Patient continues to cough. Pt has a productive cough of yellow/green sputum. Pt vaccinated against COVID with the initial two vaccines. Patient took nyquil and it helped. No chest pain or difficulty breathing.     Review of Systems   Constitutional:  Positive for chills. Negative for appetite change and fatigue.   HENT:  Positive for congestion and rhinorrhea. Negative for postnasal drip, sinus pressure, sinus pain and sore throat.    Respiratory:  Positive for cough and wheezing. Negative for chest tightness and shortness of breath.    Cardiovascular:  Negative for chest pain.   Gastrointestinal:  Negative for diarrhea, nausea and vomiting.   Musculoskeletal:  Negative for myalgias.   Neurological:  Positive for headaches.     Objective   /90   Pulse 87   Temp 37.1 °C (98.8 °F)   Resp 18   Wt 59.9 kg (132 lb)   SpO2 98%   BMI 24.14 kg/m²     Physical Exam  Vitals reviewed.   Constitutional:       General: She is not in acute distress.     Appearance: Normal appearance. She is not ill-appearing or toxic-appearing.   HENT:      Head: Atraumatic.      Right Ear: Tympanic membrane, ear canal and external ear normal.      Left Ear: Tympanic membrane, ear canal and external ear normal.      Nose: Congestion present. No rhinorrhea.      Mouth/Throat:      Mouth: Mucous membranes are moist.      Pharynx: Oropharynx is clear. No oropharyngeal exudate or posterior oropharyngeal erythema.   Eyes:      Conjunctiva/sclera: Conjunctivae normal.   Cardiovascular:      Rate and Rhythm: Normal rate and regular rhythm.      Heart sounds: Normal heart sounds. No murmur heard.  Pulmonary:      Effort: Pulmonary effort is normal.      Breath sounds: Normal breath sounds. No wheezing or rhonchi.   Musculoskeletal:          General: Normal range of motion.   Skin:     General: Skin is warm and dry.   Neurological:      General: No focal deficit present.      Mental Status: She is alert.   Psychiatric:         Mood and Affect: Mood normal.     Assessment/Plan   Problem List Items Addressed This Visit    None  Visit Diagnoses         Codes    Cough, unspecified type    -  Primary R05.9    Relevant Orders    POCT BinaxNOW Covid-19 Ag Card manually resulted (Completed)    COVID-19     U07.1    Upper respiratory tract infection, unspecified type     J06.9    Relevant Medications    doxycycline (Vibramycin) 100 mg capsule    fluticasone (Flonase) 50 mcg/actuation nasal spray        Patient is positive for COVID at this time. It has been over a week since her symptoms started so she is outside of the window for treatment with an antiviral. Patient is still symptomatic, so advised that she stays self-quarantined until her symptoms improved.     Will treat patient with doxycycline to cover for bacterial infection. Pt to also start on flonase. Advised patient on use of humidifier and hot steam treatments. Discussed that patient is to drink plenty of fluids and stay well hydrated. Can take tylenol as needed for any fevers or discomfort. Discussed that patient is to go to the ER for any chest pain, difficulty breathing, shortness of breath or new/concerning symptoms; she agreed. Pt to follow up if no better.     Of note, patient's blood pressure is slightly high in the office. On repeat measurement, patient's blood pressure improved. Pt checks her BP at home and she was advised to check it daily and write down results to ensure that her blood pressures are normal; she agreed. She will follow up with PCP if her blood pressures remain high.

## 2024-09-09 DIAGNOSIS — I10 HYPERTENSION, UNSPECIFIED TYPE: ICD-10-CM

## 2024-09-09 RX ORDER — LISINOPRIL 20 MG/1
20 TABLET ORAL DAILY
Qty: 90 TABLET | Refills: 3 | Status: SHIPPED | OUTPATIENT
Start: 2024-09-09

## 2024-09-10 DIAGNOSIS — E03.9 HYPOTHYROIDISM, UNSPECIFIED TYPE: ICD-10-CM

## 2024-09-11 RX ORDER — LEVOTHYROXINE SODIUM 125 UG/1
125 TABLET ORAL DAILY
Qty: 90 TABLET | Refills: 3 | Status: SHIPPED | OUTPATIENT
Start: 2024-09-11

## 2024-10-11 ENCOUNTER — OFFICE VISIT (OUTPATIENT)
Dept: ORTHOPEDIC SURGERY | Facility: CLINIC | Age: 79
End: 2024-10-11
Payer: MEDICARE

## 2024-10-11 DIAGNOSIS — M79.641 RIGHT HAND PAIN: Primary | ICD-10-CM

## 2024-10-11 PROCEDURE — 2500000004 HC RX 250 GENERAL PHARMACY W/ HCPCS (ALT 636 FOR OP/ED): Performed by: STUDENT IN AN ORGANIZED HEALTH CARE EDUCATION/TRAINING PROGRAM

## 2024-10-11 PROCEDURE — 20605 DRAIN/INJ JOINT/BURSA W/O US: CPT | Mod: RT | Performed by: STUDENT IN AN ORGANIZED HEALTH CARE EDUCATION/TRAINING PROGRAM

## 2024-10-11 PROCEDURE — 20600 DRAIN/INJ JOINT/BURSA W/O US: CPT | Mod: RT | Performed by: STUDENT IN AN ORGANIZED HEALTH CARE EDUCATION/TRAINING PROGRAM

## 2024-10-11 PROCEDURE — 99214 OFFICE O/P EST MOD 30 MIN: CPT | Performed by: STUDENT IN AN ORGANIZED HEALTH CARE EDUCATION/TRAINING PROGRAM

## 2024-10-11 RX ORDER — LIDOCAINE HYDROCHLORIDE 10 MG/ML
1 INJECTION, SOLUTION INFILTRATION; PERINEURAL
Status: COMPLETED | OUTPATIENT
Start: 2024-10-11 | End: 2024-10-11

## 2024-10-11 RX ORDER — LIDOCAINE HYDROCHLORIDE 10 MG/ML
0.5 INJECTION, SOLUTION INFILTRATION; PERINEURAL
Status: COMPLETED | OUTPATIENT
Start: 2024-10-11 | End: 2024-10-11

## 2024-10-11 NOTE — PROGRESS NOTES
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  Injection 3/12/24  with complete relief.  Recurrence of pain over the last month    Physical Examination:  Right upper extremity:  The patient appears to be their stated age, is in no apparent distress, and is oriented x3. The patients mood and affect are appropriate. The patients gait is normal. The examination of the limb in question was performed in comparison to the contralateral limb.    On musculoskeletal examination, additional tenderness to pain over the dorsal wrist.  Pain with wrist range of motion.  Tenderness over the right thumb CMC.      Sensation and motor function are intact in the radial, and median nerve distribution. There is no obvious thenar atrophy, and thenar strength is 5/5. There is no intrinsic atrophy, and intrinsic strength is 5/5.  The patient can make a full composite fist. The hand itself is warm and well perfused. The skin is intact throughout. The contralateral hand/wrist are normal to inspection, range of motion, stability, and strength.    M Inj/Asp: R radiocarpal on 10/11/2024 10:34 AM  Indications: pain  Details: 24 G needle, volar approach  Medications: 10 mg triamcinolone acetonide 10 mg/mL; 1 mL lidocaine 10 mg/mL (1 %)  Outcome: tolerated well, no immediate complications  Procedure, treatment alternatives, risks and benefits explained, specific risks discussed. Consent was given by the patient. Immediately prior to procedure a time out was called to verify the correct patient, procedure, equipment, support staff and site/side marked as required. Patient was prepped and draped in the usual sterile fashion.       S Inj/Asp: R thumb CMC on 10/11/2024 10:34 AM  Indications: pain  Details: 24 G needle, plantar approach  Medications: 5 mg triamcinolone acetonide 10 mg/mL; 0.5 mL lidocaine 10 mg/mL (1 %)  Outcome: tolerated well, no immediate  complications  Procedure, treatment alternatives, risks and benefits explained, specific risks discussed. Consent was given by the patient. Immediately prior to procedure a time out was called to verify the correct patient, procedure, equipment, support staff and site/side marked as required. Patient was prepped and draped in the usual sterile fashion.             Assessment:  Recurrent right wrist and thumb CMC pain.  Interested in repeat injections today    Plan:   Continue bracing and anti-inflammatories as needed.      Injection.  I explained the risks and benefits of an injection. Specifically, I reviewed the risks of injection, which include, but are not limited to, infection, bleeding, nerve injury, pain, steroid flare, glycemic alteration, subcutaneous fat atrophy, skin hypopigmentation, soft tissue damage, and incomplete symptom relief. At this time, the patient would like to proceed with an injection. After obtaining consent, I injected a 1mL combination of Kenalog and 1% lidocaine into right thumb CMC and right dorsal radiocarpal joint, sterile technique. The injection site was dressed, and the patient tolerated the injection well. I am hopeful that this injection will serve diagnostic, prognostic, and therapeutic purposes. Finally, I have emphasized patience, as any benefit may take several weeks to manifest. Depending on the success of the injection, I will see them back 4 months      Marie Harp MD

## 2024-10-23 ENCOUNTER — HOSPITAL ENCOUNTER (OUTPATIENT)
Dept: RADIOLOGY | Facility: CLINIC | Age: 79
Discharge: HOME | End: 2024-10-23
Payer: MEDICARE

## 2024-10-23 ENCOUNTER — OFFICE VISIT (OUTPATIENT)
Dept: PRIMARY CARE | Facility: CLINIC | Age: 79
End: 2024-10-23
Payer: MEDICARE

## 2024-10-23 VITALS
BODY MASS INDEX: 24.66 KG/M2 | DIASTOLIC BLOOD PRESSURE: 84 MMHG | SYSTOLIC BLOOD PRESSURE: 134 MMHG | RESPIRATION RATE: 20 BRPM | HEIGHT: 62 IN | TEMPERATURE: 97.3 F | WEIGHT: 134 LBS | HEART RATE: 76 BPM

## 2024-10-23 DIAGNOSIS — R05.2 SUBACUTE COUGH: Primary | ICD-10-CM

## 2024-10-23 DIAGNOSIS — R05.2 SUBACUTE COUGH: ICD-10-CM

## 2024-10-23 PROCEDURE — 1159F MED LIST DOCD IN RCRD: CPT | Performed by: FAMILY MEDICINE

## 2024-10-23 PROCEDURE — 1157F ADVNC CARE PLAN IN RCRD: CPT | Performed by: FAMILY MEDICINE

## 2024-10-23 PROCEDURE — 3079F DIAST BP 80-89 MM HG: CPT | Performed by: FAMILY MEDICINE

## 2024-10-23 PROCEDURE — 99213 OFFICE O/P EST LOW 20 MIN: CPT | Performed by: FAMILY MEDICINE

## 2024-10-23 PROCEDURE — 71046 X-RAY EXAM CHEST 2 VIEWS: CPT | Performed by: RADIOLOGY

## 2024-10-23 PROCEDURE — 3075F SYST BP GE 130 - 139MM HG: CPT | Performed by: FAMILY MEDICINE

## 2024-10-23 PROCEDURE — 71046 X-RAY EXAM CHEST 2 VIEWS: CPT

## 2024-10-23 PROCEDURE — 1036F TOBACCO NON-USER: CPT | Performed by: FAMILY MEDICINE

## 2024-10-23 RX ORDER — AZITHROMYCIN 250 MG/1
TABLET, FILM COATED ORAL
Qty: 6 TABLET | Refills: 0 | Status: SHIPPED | OUTPATIENT
Start: 2024-10-23 | End: 2024-10-28

## 2024-10-23 RX ORDER — CODEINE PHOSPHATE AND GUAIFENESIN 10; 100 MG/5ML; MG/5ML
5 SOLUTION ORAL EVERY 6 HOURS PRN
Qty: 200 ML | Refills: 0 | Status: SHIPPED | OUTPATIENT
Start: 2024-10-23 | End: 2024-10-30

## 2024-10-23 RX ORDER — PREDNISONE 20 MG/1
40 TABLET ORAL DAILY
Qty: 10 TABLET | Refills: 0 | Status: SHIPPED | OUTPATIENT
Start: 2024-10-23 | End: 2024-10-28

## 2024-10-23 ASSESSMENT — ENCOUNTER SYMPTOMS
PALPITATIONS: 0
SHORTNESS OF BREATH: 1
POLYPHAGIA: 0
CHOKING: 0
NAUSEA: 0
WEAKNESS: 0
POLYDIPSIA: 0
DIARRHEA: 0
FREQUENCY: 0
MYALGIAS: 0
DIZZINESS: 0
COUGH: 1
APPETITE CHANGE: 0
HEADACHES: 0
FATIGUE: 0
VOMITING: 0
NUMBNESS: 0
CHEST TIGHTNESS: 0
ARTHRALGIAS: 0

## 2024-10-23 NOTE — PROGRESS NOTES
"Subjective   Patient ID: Manasa Goel is a 79 y.o. female who presents for Cough (Dry cough and runny/stuffy nose for about a week. No other symptoms. ).    HPI     Review of Systems   Constitutional:  Negative for appetite change and fatigue.   Eyes:  Negative for visual disturbance.   Respiratory:  Positive for cough and shortness of breath. Negative for choking and chest tightness.    Cardiovascular:  Negative for chest pain, palpitations and leg swelling.   Gastrointestinal:  Negative for diarrhea, nausea and vomiting.   Endocrine: Negative for polydipsia, polyphagia and polyuria.   Genitourinary:  Negative for frequency and urgency.   Musculoskeletal:  Negative for arthralgias and myalgias.   Neurological:  Negative for dizziness, syncope, weakness, numbness and headaches.       Objective   /64   Pulse 76   Temp 36.3 °C (97.3 °F)   Resp 20   Ht 1.575 m (5' 2\")   Wt 60.8 kg (134 lb)   BMI 24.51 kg/m²     Physical Exam  Constitutional:       Appearance: Normal appearance.   HENT:      Head: Normocephalic.   Eyes:      Conjunctiva/sclera: Conjunctivae normal.   Cardiovascular:      Rate and Rhythm: Normal rate and regular rhythm.      Heart sounds: Normal heart sounds.   Pulmonary:      Effort: Pulmonary effort is normal.      Breath sounds: Normal breath sounds.   Musculoskeletal:      Cervical back: Neck supple.   Skin:     General: Skin is warm and dry.   Neurological:      Mental Status: She is alert.       Assessment/Plan   Problem List Items Addressed This Visit    None  Visit Diagnoses         Codes    Subacute cough    -  Primary R05.2    Relevant Medications    azithromycin (Zithromax) 250 mg tablet    predniSONE (Deltasone) 20 mg tablet    codeine-guaifenesin (Robitussin-AC)  mg/5 mL syrup    Other Relevant Orders    XR chest 2 views               "

## 2024-12-02 ENCOUNTER — APPOINTMENT (OUTPATIENT)
Dept: PRIMARY CARE | Facility: CLINIC | Age: 79
End: 2024-12-02
Payer: MEDICARE

## 2024-12-02 VITALS
SYSTOLIC BLOOD PRESSURE: 120 MMHG | WEIGHT: 131 LBS | TEMPERATURE: 97.2 F | RESPIRATION RATE: 18 BRPM | HEIGHT: 62 IN | DIASTOLIC BLOOD PRESSURE: 68 MMHG | OXYGEN SATURATION: 99 % | HEART RATE: 82 BPM | BODY MASS INDEX: 24.11 KG/M2

## 2024-12-02 DIAGNOSIS — D64.9 ANEMIA, UNSPECIFIED TYPE: ICD-10-CM

## 2024-12-02 DIAGNOSIS — Z00.00 WELL ADULT EXAM: ICD-10-CM

## 2024-12-02 DIAGNOSIS — Z13.220 LIPID SCREENING: ICD-10-CM

## 2024-12-02 DIAGNOSIS — R73.9 HYPERGLYCEMIA: ICD-10-CM

## 2024-12-02 DIAGNOSIS — Z00.00 MEDICARE ANNUAL WELLNESS VISIT, SUBSEQUENT: Primary | ICD-10-CM

## 2024-12-02 DIAGNOSIS — E03.9 HYPOTHYROIDISM, UNSPECIFIED TYPE: ICD-10-CM

## 2024-12-02 PROCEDURE — 99213 OFFICE O/P EST LOW 20 MIN: CPT | Performed by: FAMILY MEDICINE

## 2024-12-02 PROCEDURE — G0439 PPPS, SUBSEQ VISIT: HCPCS | Performed by: FAMILY MEDICINE

## 2024-12-02 ASSESSMENT — ENCOUNTER SYMPTOMS
APPETITE CHANGE: 0
POLYPHAGIA: 0
HEADACHES: 0
CHEST TIGHTNESS: 0
DIZZINESS: 0
NAUSEA: 0
ARTHRALGIAS: 0
SHORTNESS OF BREATH: 0
DIARRHEA: 0
NUMBNESS: 0
VOMITING: 0
FREQUENCY: 0
MYALGIAS: 0
WEAKNESS: 0
FATIGUE: 0
POLYDIPSIA: 0

## 2024-12-02 ASSESSMENT — PATIENT HEALTH QUESTIONNAIRE - PHQ9
SUM OF ALL RESPONSES TO PHQ9 QUESTIONS 1 AND 2: 0
2. FEELING DOWN, DEPRESSED OR HOPELESS: NOT AT ALL
1. LITTLE INTEREST OR PLEASURE IN DOING THINGS: NOT AT ALL

## 2024-12-02 ASSESSMENT — ACTIVITIES OF DAILY LIVING (ADL)
GROCERY_SHOPPING: INDEPENDENT
BATHING: INDEPENDENT
MANAGING_FINANCES: INDEPENDENT
DRESSING: INDEPENDENT
TAKING_MEDICATION: INDEPENDENT
DOING_HOUSEWORK: INDEPENDENT

## 2024-12-02 NOTE — PROGRESS NOTES
"Subjective   Reason for Visit: Manasa Goel is an 79 y.o. female here for a Medicare Wellness visit.     Past Medical, Surgical, and Family History reviewed and updated in chart.    Reviewed all medications by prescribing practitioner or clinical pharmacist (such as prescriptions, OTCs, herbal therapies and supplements) and documented in the medical record.    HPI    Patient Care Team:  Marcelino Hammer DO as PCP - General  Marcelino Hammer DO as PCP - [facility] Attributed Provider     Review of Systems   Constitutional:  Negative for appetite change and fatigue.   Eyes:  Negative for visual disturbance.   Respiratory:  Negative for chest tightness and shortness of breath.    Cardiovascular:  Negative for chest pain and leg swelling.   Gastrointestinal:  Negative for diarrhea, nausea and vomiting.   Endocrine: Negative for polydipsia, polyphagia and polyuria.   Genitourinary:  Negative for frequency and urgency.   Musculoskeletal:  Negative for arthralgias and myalgias.   Neurological:  Negative for dizziness, syncope, weakness, numbness and headaches.       Objective   Vitals:  /68   Pulse 82   Temp 36.2 °C (97.2 °F)   Resp 18   Ht 1.575 m (5' 2\")   Wt 59.4 kg (131 lb)   SpO2 99%   BMI 23.96 kg/m²       Physical Exam  Constitutional:       Appearance: Normal appearance.   HENT:      Head: Normocephalic.   Eyes:      Conjunctiva/sclera: Conjunctivae normal.   Cardiovascular:      Rate and Rhythm: Normal rate and regular rhythm.      Heart sounds: Normal heart sounds.   Pulmonary:      Effort: Pulmonary effort is normal.      Breath sounds: Normal breath sounds.   Abdominal:      Tenderness: There is no abdominal tenderness.   Musculoskeletal:      Cervical back: Neck supple.   Skin:     General: Skin is warm and dry.   Neurological:      Mental Status: She is alert.         Assessment & Plan  Medicare annual wellness visit, subsequent         Well adult exam    Orders:    CBC and Auto Differential; " Future    Comprehensive Metabolic Panel; Future    Hemoglobin A1C; Future    Lipid Panel; Future    Thyroid Stimulating Hormone; Future    T4, free; Future    Lipid screening    Orders:    CBC and Auto Differential; Future    Comprehensive Metabolic Panel; Future    Hemoglobin A1C; Future    Lipid Panel; Future    Thyroid Stimulating Hormone; Future    T4, free; Future    Anemia, unspecified type    Orders:    CBC and Auto Differential; Future    Comprehensive Metabolic Panel; Future    Hemoglobin A1C; Future    Lipid Panel; Future    Thyroid Stimulating Hormone; Future    T4, free; Future    Hypothyroidism, unspecified type    Orders:    CBC and Auto Differential; Future    Comprehensive Metabolic Panel; Future    Hemoglobin A1C; Future    Lipid Panel; Future    Thyroid Stimulating Hormone; Future    T4, free; Future    Hyperglycemia    Orders:    Hemoglobin A1C; Future

## 2024-12-03 NOTE — ASSESSMENT & PLAN NOTE
Orders:    CBC and Auto Differential; Future    Comprehensive Metabolic Panel; Future    Hemoglobin A1C; Future    Lipid Panel; Future    Thyroid Stimulating Hormone; Future    T4, free; Future

## 2024-12-06 ENCOUNTER — LAB (OUTPATIENT)
Dept: LAB | Facility: LAB | Age: 79
End: 2024-12-06
Payer: MEDICARE

## 2024-12-06 DIAGNOSIS — D64.9 ANEMIA, UNSPECIFIED TYPE: ICD-10-CM

## 2024-12-06 DIAGNOSIS — R73.9 HYPERGLYCEMIA: ICD-10-CM

## 2024-12-06 DIAGNOSIS — E03.9 HYPOTHYROIDISM, UNSPECIFIED TYPE: ICD-10-CM

## 2024-12-06 DIAGNOSIS — Z13.220 LIPID SCREENING: ICD-10-CM

## 2024-12-06 DIAGNOSIS — Z00.00 WELL ADULT EXAM: ICD-10-CM

## 2024-12-06 LAB
ALBUMIN SERPL BCP-MCNC: 4.4 G/DL (ref 3.4–5)
ALP SERPL-CCNC: 91 U/L (ref 33–136)
ALT SERPL W P-5'-P-CCNC: 16 U/L (ref 7–45)
ANION GAP SERPL CALC-SCNC: 8 MMOL/L (ref 10–20)
AST SERPL W P-5'-P-CCNC: 17 U/L (ref 9–39)
BASOPHILS # BLD AUTO: 0.05 X10*3/UL (ref 0–0.1)
BASOPHILS NFR BLD AUTO: 0.9 %
BILIRUB SERPL-MCNC: 0.3 MG/DL (ref 0–1.2)
BUN SERPL-MCNC: 17 MG/DL (ref 6–23)
CALCIUM SERPL-MCNC: 10.1 MG/DL (ref 8.6–10.3)
CHLORIDE SERPL-SCNC: 108 MMOL/L (ref 98–107)
CHOLEST SERPL-MCNC: 226 MG/DL (ref 0–199)
CHOLESTEROL/HDL RATIO: 3.6
CO2 SERPL-SCNC: 30 MMOL/L (ref 21–32)
CREAT SERPL-MCNC: 0.8 MG/DL (ref 0.5–1.05)
EGFRCR SERPLBLD CKD-EPI 2021: 74 ML/MIN/1.73M*2
EOSINOPHIL # BLD AUTO: 0.52 X10*3/UL (ref 0–0.4)
EOSINOPHIL NFR BLD AUTO: 9 %
ERYTHROCYTE [DISTWIDTH] IN BLOOD BY AUTOMATED COUNT: 13.5 % (ref 11.5–14.5)
EST. AVERAGE GLUCOSE BLD GHB EST-MCNC: 103 MG/DL
GLUCOSE SERPL-MCNC: 85 MG/DL (ref 74–99)
HBA1C MFR BLD: 5.2 %
HCT VFR BLD AUTO: 38.6 % (ref 36–46)
HDLC SERPL-MCNC: 62 MG/DL
HGB BLD-MCNC: 12.6 G/DL (ref 12–16)
IMM GRANULOCYTES # BLD AUTO: 0.01 X10*3/UL (ref 0–0.5)
IMM GRANULOCYTES NFR BLD AUTO: 0.2 % (ref 0–0.9)
LDLC SERPL CALC-MCNC: 139 MG/DL
LYMPHOCYTES # BLD AUTO: 1.96 X10*3/UL (ref 0.8–3)
LYMPHOCYTES NFR BLD AUTO: 33.8 %
MCH RBC QN AUTO: 33.4 PG (ref 26–34)
MCHC RBC AUTO-ENTMCNC: 32.6 G/DL (ref 32–36)
MCV RBC AUTO: 102 FL (ref 80–100)
MONOCYTES # BLD AUTO: 0.47 X10*3/UL (ref 0.05–0.8)
MONOCYTES NFR BLD AUTO: 8.1 %
NEUTROPHILS # BLD AUTO: 2.79 X10*3/UL (ref 1.6–5.5)
NEUTROPHILS NFR BLD AUTO: 48 %
NON HDL CHOLESTEROL: 164 MG/DL (ref 0–149)
NRBC BLD-RTO: 0 /100 WBCS (ref 0–0)
PLATELET # BLD AUTO: 270 X10*3/UL (ref 150–450)
POTASSIUM SERPL-SCNC: 4.4 MMOL/L (ref 3.5–5.3)
PROT SERPL-MCNC: 6.6 G/DL (ref 6.4–8.2)
RBC # BLD AUTO: 3.77 X10*6/UL (ref 4–5.2)
SODIUM SERPL-SCNC: 142 MMOL/L (ref 136–145)
T4 FREE SERPL-MCNC: 1.02 NG/DL (ref 0.61–1.12)
TRIGL SERPL-MCNC: 124 MG/DL (ref 0–149)
TSH SERPL-ACNC: 2.33 MIU/L (ref 0.44–3.98)
VLDL: 25 MG/DL (ref 0–40)
WBC # BLD AUTO: 5.8 X10*3/UL (ref 4.4–11.3)

## 2024-12-06 PROCEDURE — 85025 COMPLETE CBC W/AUTO DIFF WBC: CPT

## 2024-12-06 PROCEDURE — 83036 HEMOGLOBIN GLYCOSYLATED A1C: CPT

## 2024-12-06 PROCEDURE — 80061 LIPID PANEL: CPT

## 2024-12-06 PROCEDURE — 36415 COLL VENOUS BLD VENIPUNCTURE: CPT

## 2024-12-06 PROCEDURE — 84443 ASSAY THYROID STIM HORMONE: CPT

## 2024-12-06 PROCEDURE — 80053 COMPREHEN METABOLIC PANEL: CPT

## 2024-12-06 PROCEDURE — 84439 ASSAY OF FREE THYROXINE: CPT

## 2025-02-10 ENCOUNTER — HOSPITAL ENCOUNTER (INPATIENT)
Facility: HOSPITAL | Age: 80
LOS: 1 days | Discharge: HOME | DRG: 308 | End: 2025-02-11
Attending: EMERGENCY MEDICINE | Admitting: STUDENT IN AN ORGANIZED HEALTH CARE EDUCATION/TRAINING PROGRAM
Payer: MEDICARE

## 2025-02-10 ENCOUNTER — TELEPHONE (OUTPATIENT)
Dept: PRIMARY CARE | Facility: CLINIC | Age: 80
End: 2025-02-10
Payer: MEDICARE

## 2025-02-10 ENCOUNTER — APPOINTMENT (OUTPATIENT)
Dept: RADIOLOGY | Facility: HOSPITAL | Age: 80
DRG: 308 | End: 2025-02-10
Payer: MEDICARE

## 2025-02-10 ENCOUNTER — APPOINTMENT (OUTPATIENT)
Dept: CARDIOLOGY | Facility: HOSPITAL | Age: 80
DRG: 308 | End: 2025-02-10
Payer: MEDICARE

## 2025-02-10 DIAGNOSIS — J06.9 URI, ACUTE: ICD-10-CM

## 2025-02-10 DIAGNOSIS — I48.0 PAROXYSMAL ATRIAL FIBRILLATION (MULTI): ICD-10-CM

## 2025-02-10 DIAGNOSIS — I47.10 SVT (SUPRAVENTRICULAR TACHYCARDIA) (CMS-HCC): ICD-10-CM

## 2025-02-10 DIAGNOSIS — R09.02 HYPOXIA: ICD-10-CM

## 2025-02-10 DIAGNOSIS — R06.02 SHORTNESS OF BREATH: Primary | ICD-10-CM

## 2025-02-10 DIAGNOSIS — J96.01 ACUTE HYPOXIC RESPIRATORY FAILURE (MULTI): ICD-10-CM

## 2025-02-10 LAB
ALBUMIN SERPL BCP-MCNC: 4.8 G/DL (ref 3.4–5)
ALP SERPL-CCNC: 120 U/L (ref 33–136)
ALT SERPL W P-5'-P-CCNC: 24 U/L (ref 7–45)
ANION GAP BLDV CALCULATED.4IONS-SCNC: 8 MMOL/L (ref 10–25)
ANION GAP SERPL CALC-SCNC: 11 MMOL/L (ref 10–20)
AORTIC VALVE PEAK VELOCITY: 1.36 M/S
AST SERPL W P-5'-P-CCNC: 26 U/L (ref 9–39)
AV PEAK GRADIENT: 7 MMHG
AVA (PEAK VEL): 3.07 CM2
BASE EXCESS BLDV CALC-SCNC: 0.8 MMOL/L (ref -2–3)
BASOPHILS # BLD AUTO: 0.06 X10*3/UL (ref 0–0.1)
BASOPHILS NFR BLD AUTO: 0.5 %
BILIRUB SERPL-MCNC: 0.4 MG/DL (ref 0–1.2)
BNP SERPL-MCNC: 117 PG/ML (ref 0–99)
BODY TEMPERATURE: ABNORMAL
BUN SERPL-MCNC: 23 MG/DL (ref 6–23)
CA-I BLDV-SCNC: 1.33 MMOL/L (ref 1.1–1.33)
CALCIUM SERPL-MCNC: 10.1 MG/DL (ref 8.6–10.3)
CARDIAC TROPONIN I PNL SERPL HS: 5 NG/L (ref 0–13)
CARDIAC TROPONIN I PNL SERPL HS: 7 NG/L (ref 0–13)
CARDIAC TROPONIN I PNL SERPL HS: 7 NG/L (ref 0–13)
CHLORIDE BLDV-SCNC: 106 MMOL/L (ref 98–107)
CHLORIDE SERPL-SCNC: 105 MMOL/L (ref 98–107)
CO2 SERPL-SCNC: 28 MMOL/L (ref 21–32)
CREAT SERPL-MCNC: 0.73 MG/DL (ref 0.5–1.05)
EGFRCR SERPLBLD CKD-EPI 2021: 83 ML/MIN/1.73M*2
EJECTION FRACTION APICAL 4 CHAMBER: 60.9
EJECTION FRACTION: 63 %
EOSINOPHIL # BLD AUTO: 0.63 X10*3/UL (ref 0–0.4)
EOSINOPHIL NFR BLD AUTO: 5.1 %
ERYTHROCYTE [DISTWIDTH] IN BLOOD BY AUTOMATED COUNT: 13 % (ref 11.5–14.5)
FLUAV RNA RESP QL NAA+PROBE: NOT DETECTED
FLUBV RNA RESP QL NAA+PROBE: NOT DETECTED
GLUCOSE BLDV-MCNC: 103 MG/DL (ref 74–99)
GLUCOSE SERPL-MCNC: 105 MG/DL (ref 74–99)
HCO3 BLDV-SCNC: 27.1 MMOL/L (ref 22–26)
HCT VFR BLD AUTO: 41 % (ref 36–46)
HCT VFR BLD EST: 38 % (ref 36–46)
HGB BLD-MCNC: 13.6 G/DL (ref 12–16)
HGB BLDV-MCNC: 12.7 G/DL (ref 12–16)
HOLD SPECIMEN: NORMAL
IMM GRANULOCYTES # BLD AUTO: 0.04 X10*3/UL (ref 0–0.5)
IMM GRANULOCYTES NFR BLD AUTO: 0.3 % (ref 0–0.9)
INHALED O2 CONCENTRATION: 21 %
LACTATE BLDV-SCNC: 1.7 MMOL/L (ref 0.4–2)
LEFT VENTRICLE INTERNAL DIMENSION DIASTOLE: 4.47 CM (ref 3.5–6)
LEFT VENTRICULAR OUTFLOW TRACT DIAMETER: 2.15 CM
LV EJECTION FRACTION BIPLANE: 59 %
LYMPHOCYTES # BLD AUTO: 2 X10*3/UL (ref 0.8–3)
LYMPHOCYTES NFR BLD AUTO: 16.2 %
MAGNESIUM SERPL-MCNC: 2.06 MG/DL (ref 1.6–2.4)
MCH RBC QN AUTO: 33.5 PG (ref 26–34)
MCHC RBC AUTO-ENTMCNC: 33.2 G/DL (ref 32–36)
MCV RBC AUTO: 101 FL (ref 80–100)
MITRAL VALVE E/A RATIO: 0.8
MONOCYTES # BLD AUTO: 0.69 X10*3/UL (ref 0.05–0.8)
MONOCYTES NFR BLD AUTO: 5.6 %
NEUTROPHILS # BLD AUTO: 8.95 X10*3/UL (ref 1.6–5.5)
NEUTROPHILS NFR BLD AUTO: 72.3 %
NRBC BLD-RTO: 0 /100 WBCS (ref 0–0)
OXYHGB MFR BLDV: 48.7 % (ref 45–75)
PCO2 BLDV: 49 MM HG (ref 41–51)
PH BLDV: 7.35 PH (ref 7.33–7.43)
PLATELET # BLD AUTO: 277 X10*3/UL (ref 150–450)
PO2 BLDV: 28 MM HG (ref 35–45)
POTASSIUM BLDV-SCNC: 4.4 MMOL/L (ref 3.5–5.3)
POTASSIUM SERPL-SCNC: 3.8 MMOL/L (ref 3.5–5.3)
PROT SERPL-MCNC: 7.5 G/DL (ref 6.4–8.2)
Q ONSET: 225 MS
Q ONSET: 226 MS
QRS COUNT: 23 BEATS
QRS COUNT: 28 BEATS
QRS DURATION: 162 MS
QRS DURATION: 72 MS
QT INTERVAL: 270 MS
QT INTERVAL: 298 MS
QTC CALCULATION(BAZETT): 458 MS
QTC CALCULATION(BAZETT): 462 MS
QTC FREDERICIA: 384 MS
QTC FREDERICIA: 399 MS
R AXIS: 41 DEGREES
R AXIS: 47 DEGREES
RBC # BLD AUTO: 4.06 X10*6/UL (ref 4–5.2)
RIGHT VENTRICLE FREE WALL PEAK S': 20 CM/S
RIGHT VENTRICLE PEAK SYSTOLIC PRESSURE: 32.4 MMHG
RSV RNA RESP QL NAA+PROBE: NOT DETECTED
SAO2 % BLDV: 50 % (ref 45–75)
SARS-COV-2 RNA RESP QL NAA+PROBE: NOT DETECTED
SODIUM BLDV-SCNC: 137 MMOL/L (ref 136–145)
SODIUM SERPL-SCNC: 140 MMOL/L (ref 136–145)
T AXIS: -32 DEGREES
T AXIS: -5 DEGREES
T OFFSET: 361 MS
T OFFSET: 374 MS
TRICUSPID ANNULAR PLANE SYSTOLIC EXCURSION: 1.8 CM
VENTRICULAR RATE: 145 BPM
VENTRICULAR RATE: 173 BPM
WBC # BLD AUTO: 12.4 X10*3/UL (ref 4.4–11.3)

## 2025-02-10 PROCEDURE — 94640 AIRWAY INHALATION TREATMENT: CPT

## 2025-02-10 PROCEDURE — 71275 CT ANGIOGRAPHY CHEST: CPT

## 2025-02-10 PROCEDURE — 84132 ASSAY OF SERUM POTASSIUM: CPT

## 2025-02-10 PROCEDURE — 71045 X-RAY EXAM CHEST 1 VIEW: CPT

## 2025-02-10 PROCEDURE — 2500000004 HC RX 250 GENERAL PHARMACY W/ HCPCS (ALT 636 FOR OP/ED)

## 2025-02-10 PROCEDURE — 85025 COMPLETE CBC W/AUTO DIFF WBC: CPT

## 2025-02-10 PROCEDURE — 2500000005 HC RX 250 GENERAL PHARMACY W/O HCPCS

## 2025-02-10 PROCEDURE — 83735 ASSAY OF MAGNESIUM: CPT

## 2025-02-10 PROCEDURE — 84484 ASSAY OF TROPONIN QUANT: CPT | Performed by: STUDENT IN AN ORGANIZED HEALTH CARE EDUCATION/TRAINING PROGRAM

## 2025-02-10 PROCEDURE — 99285 EMERGENCY DEPT VISIT HI MDM: CPT | Mod: 25 | Performed by: EMERGENCY MEDICINE

## 2025-02-10 PROCEDURE — 93306 TTE W/DOPPLER COMPLETE: CPT

## 2025-02-10 PROCEDURE — 99223 1ST HOSP IP/OBS HIGH 75: CPT

## 2025-02-10 PROCEDURE — 84484 ASSAY OF TROPONIN QUANT: CPT

## 2025-02-10 PROCEDURE — 96361 HYDRATE IV INFUSION ADD-ON: CPT

## 2025-02-10 PROCEDURE — 71275 CT ANGIOGRAPHY CHEST: CPT | Performed by: RADIOLOGY

## 2025-02-10 PROCEDURE — 36415 COLL VENOUS BLD VENIPUNCTURE: CPT

## 2025-02-10 PROCEDURE — 93306 TTE W/DOPPLER COMPLETE: CPT | Performed by: INTERNAL MEDICINE

## 2025-02-10 PROCEDURE — 99285 EMERGENCY DEPT VISIT HI MDM: CPT | Performed by: EMERGENCY MEDICINE

## 2025-02-10 PROCEDURE — 96375 TX/PRO/DX INJ NEW DRUG ADDON: CPT

## 2025-02-10 PROCEDURE — 93005 ELECTROCARDIOGRAM TRACING: CPT

## 2025-02-10 PROCEDURE — 83880 ASSAY OF NATRIURETIC PEPTIDE: CPT

## 2025-02-10 PROCEDURE — 96365 THER/PROPH/DIAG IV INF INIT: CPT | Mod: 59

## 2025-02-10 PROCEDURE — 2500000005 HC RX 250 GENERAL PHARMACY W/O HCPCS: Performed by: STUDENT IN AN ORGANIZED HEALTH CARE EDUCATION/TRAINING PROGRAM

## 2025-02-10 PROCEDURE — 87637 SARSCOV2&INF A&B&RSV AMP PRB: CPT

## 2025-02-10 PROCEDURE — 71045 X-RAY EXAM CHEST 1 VIEW: CPT | Performed by: RADIOLOGY

## 2025-02-10 PROCEDURE — 99223 1ST HOSP IP/OBS HIGH 75: CPT | Performed by: STUDENT IN AN ORGANIZED HEALTH CARE EDUCATION/TRAINING PROGRAM

## 2025-02-10 PROCEDURE — 2500000001 HC RX 250 WO HCPCS SELF ADMINISTERED DRUGS (ALT 637 FOR MEDICARE OP): Performed by: INTERNAL MEDICINE

## 2025-02-10 PROCEDURE — 2550000001 HC RX 255 CONTRASTS: Performed by: EMERGENCY MEDICINE

## 2025-02-10 PROCEDURE — 2500000002 HC RX 250 W HCPCS SELF ADMINISTERED DRUGS (ALT 637 FOR MEDICARE OP, ALT 636 FOR OP/ED)

## 2025-02-10 PROCEDURE — 2060000001 HC INTERMEDIATE ICU ROOM DAILY

## 2025-02-10 PROCEDURE — 2500000002 HC RX 250 W HCPCS SELF ADMINISTERED DRUGS (ALT 637 FOR MEDICARE OP, ALT 636 FOR OP/ED): Performed by: STUDENT IN AN ORGANIZED HEALTH CARE EDUCATION/TRAINING PROGRAM

## 2025-02-10 PROCEDURE — 96372 THER/PROPH/DIAG INJ SC/IM: CPT

## 2025-02-10 RX ORDER — MAGNESIUM SULFATE HEPTAHYDRATE 40 MG/ML
INJECTION, SOLUTION INTRAVENOUS
Status: COMPLETED
Start: 2025-02-10 | End: 2025-02-10

## 2025-02-10 RX ORDER — IPRATROPIUM BROMIDE AND ALBUTEROL SULFATE 2.5; .5 MG/3ML; MG/3ML
3 SOLUTION RESPIRATORY (INHALATION)
Status: DISCONTINUED | OUTPATIENT
Start: 2025-02-10 | End: 2025-02-10

## 2025-02-10 RX ORDER — IPRATROPIUM BROMIDE AND ALBUTEROL SULFATE 2.5; .5 MG/3ML; MG/3ML
3 SOLUTION RESPIRATORY (INHALATION) ONCE
Status: COMPLETED | OUTPATIENT
Start: 2025-02-10 | End: 2025-02-10

## 2025-02-10 RX ORDER — FLUTICASONE PROPIONATE 50 MCG
1 SPRAY, SUSPENSION (ML) NASAL DAILY
Status: DISCONTINUED | OUTPATIENT
Start: 2025-02-10 | End: 2025-02-11 | Stop reason: HOSPADM

## 2025-02-10 RX ORDER — LEVOTHYROXINE SODIUM 125 UG/1
125 TABLET ORAL DAILY
Status: DISCONTINUED | OUTPATIENT
Start: 2025-02-10 | End: 2025-02-11 | Stop reason: HOSPADM

## 2025-02-10 RX ORDER — ACETAMINOPHEN 160 MG/5ML
650 SOLUTION ORAL EVERY 4 HOURS PRN
Status: DISCONTINUED | OUTPATIENT
Start: 2025-02-10 | End: 2025-02-11 | Stop reason: HOSPADM

## 2025-02-10 RX ORDER — LISINOPRIL 20 MG/1
20 TABLET ORAL DAILY
Status: DISCONTINUED | OUTPATIENT
Start: 2025-02-10 | End: 2025-02-11 | Stop reason: HOSPADM

## 2025-02-10 RX ORDER — ACETAMINOPHEN 650 MG/1
650 SUPPOSITORY RECTAL EVERY 4 HOURS PRN
Status: DISCONTINUED | OUTPATIENT
Start: 2025-02-10 | End: 2025-02-11 | Stop reason: HOSPADM

## 2025-02-10 RX ORDER — IPRATROPIUM BROMIDE AND ALBUTEROL SULFATE 2.5; .5 MG/3ML; MG/3ML
3 SOLUTION RESPIRATORY (INHALATION) EVERY 20 MIN
Status: ACTIVE | OUTPATIENT
Start: 2025-02-10 | End: 2025-02-10

## 2025-02-10 RX ORDER — IPRATROPIUM BROMIDE AND ALBUTEROL SULFATE 2.5; .5 MG/3ML; MG/3ML
3 SOLUTION RESPIRATORY (INHALATION)
Status: DISCONTINUED | OUTPATIENT
Start: 2025-02-11 | End: 2025-02-11 | Stop reason: HOSPADM

## 2025-02-10 RX ORDER — ONDANSETRON 4 MG/1
4 TABLET, FILM COATED ORAL EVERY 8 HOURS PRN
Status: DISCONTINUED | OUTPATIENT
Start: 2025-02-10 | End: 2025-02-11 | Stop reason: HOSPADM

## 2025-02-10 RX ORDER — GUAIFENESIN/DEXTROMETHORPHAN 100-10MG/5
5 SYRUP ORAL EVERY 4 HOURS PRN
Status: DISCONTINUED | OUTPATIENT
Start: 2025-02-10 | End: 2025-02-11 | Stop reason: HOSPADM

## 2025-02-10 RX ORDER — ENOXAPARIN SODIUM 100 MG/ML
1 INJECTION SUBCUTANEOUS ONCE
Status: COMPLETED | OUTPATIENT
Start: 2025-02-10 | End: 2025-02-10

## 2025-02-10 RX ORDER — ALBUTEROL SULFATE 0.83 MG/ML
2.5 SOLUTION RESPIRATORY (INHALATION) EVERY 2 HOUR PRN
Status: DISCONTINUED | OUTPATIENT
Start: 2025-02-10 | End: 2025-02-11 | Stop reason: HOSPADM

## 2025-02-10 RX ORDER — ENOXAPARIN SODIUM 100 MG/ML
1 INJECTION SUBCUTANEOUS ONCE
Status: CANCELLED | OUTPATIENT
Start: 2025-02-10

## 2025-02-10 RX ORDER — ONDANSETRON HYDROCHLORIDE 2 MG/ML
4 INJECTION, SOLUTION INTRAVENOUS EVERY 8 HOURS PRN
Status: DISCONTINUED | OUTPATIENT
Start: 2025-02-10 | End: 2025-02-11 | Stop reason: HOSPADM

## 2025-02-10 RX ORDER — MAGNESIUM SULFATE HEPTAHYDRATE 40 MG/ML
2 INJECTION, SOLUTION INTRAVENOUS ONCE
Status: COMPLETED | OUTPATIENT
Start: 2025-02-10 | End: 2025-02-10

## 2025-02-10 RX ORDER — DILTIAZEM HYDROCHLORIDE 5 MG/ML
0.25 INJECTION INTRAVENOUS ONCE
Status: COMPLETED | OUTPATIENT
Start: 2025-02-10 | End: 2025-02-10

## 2025-02-10 RX ORDER — ACETAMINOPHEN 325 MG/1
650 TABLET ORAL EVERY 4 HOURS PRN
Status: DISCONTINUED | OUTPATIENT
Start: 2025-02-10 | End: 2025-02-11 | Stop reason: HOSPADM

## 2025-02-10 RX ORDER — MV-MIN/FA/VIT K/LUTEIN/ZEAXANT 200MCG-5MG
2 CAPSULE ORAL
COMMUNITY

## 2025-02-10 RX ORDER — VERAPAMIL HYDROCHLORIDE 40 MG/1
80 TABLET ORAL EVERY 8 HOURS SCHEDULED
Status: DISCONTINUED | OUTPATIENT
Start: 2025-02-10 | End: 2025-02-11 | Stop reason: HOSPADM

## 2025-02-10 RX ORDER — PREDNISONE 20 MG/1
40 TABLET ORAL DAILY
Status: DISCONTINUED | OUTPATIENT
Start: 2025-02-11 | End: 2025-02-11 | Stop reason: HOSPADM

## 2025-02-10 RX ORDER — VERAPAMIL HYDROCHLORIDE 40 MG/1
40 TABLET ORAL EVERY 8 HOURS SCHEDULED
Status: DISCONTINUED | OUTPATIENT
Start: 2025-02-10 | End: 2025-02-10

## 2025-02-10 RX ORDER — TALC
3 POWDER (GRAM) TOPICAL NIGHTLY PRN
Status: DISCONTINUED | OUTPATIENT
Start: 2025-02-10 | End: 2025-02-11 | Stop reason: HOSPADM

## 2025-02-10 RX ORDER — ENOXAPARIN SODIUM 100 MG/ML
40 INJECTION SUBCUTANEOUS EVERY 24 HOURS
Status: DISCONTINUED | OUTPATIENT
Start: 2025-02-10 | End: 2025-02-10

## 2025-02-10 RX ADMIN — IOHEXOL 60 ML: 350 INJECTION, SOLUTION INTRAVENOUS at 06:41

## 2025-02-10 RX ADMIN — SODIUM CHLORIDE, POTASSIUM CHLORIDE, SODIUM LACTATE AND CALCIUM CHLORIDE 1000 ML: 600; 310; 30; 20 INJECTION, SOLUTION INTRAVENOUS at 06:05

## 2025-02-10 RX ADMIN — DEXTROSE MONOHYDRATE 5 MG/HR: 50 INJECTION, SOLUTION INTRAVENOUS at 10:34

## 2025-02-10 RX ADMIN — IPRATROPIUM BROMIDE AND ALBUTEROL SULFATE 3 ML: 2.5; .5 SOLUTION RESPIRATORY (INHALATION) at 13:36

## 2025-02-10 RX ADMIN — DILTIAZEM HYDROCHLORIDE 14.75 MG: 5 INJECTION, SOLUTION INTRAVENOUS at 10:33

## 2025-02-10 RX ADMIN — Medication 3 L/MIN: at 09:13

## 2025-02-10 RX ADMIN — APIXABAN 5 MG: 5 TABLET, FILM COATED ORAL at 20:49

## 2025-02-10 RX ADMIN — ENOXAPARIN SODIUM 60 MG: 60 INJECTION SUBCUTANEOUS at 13:36

## 2025-02-10 RX ADMIN — Medication 2 L/MIN: at 06:56

## 2025-02-10 RX ADMIN — MAGNESIUM SULFATE HEPTAHYDRATE 2 G: 40 INJECTION, SOLUTION INTRAVENOUS at 06:16

## 2025-02-10 RX ADMIN — VERAPAMIL HYDROCHLORIDE 80 MG: 40 TABLET, FILM COATED ORAL at 19:39

## 2025-02-10 RX ADMIN — Medication 3 MG: at 20:49

## 2025-02-10 RX ADMIN — IPRATROPIUM BROMIDE AND ALBUTEROL SULFATE 3 ML: 2.5; .5 SOLUTION RESPIRATORY (INHALATION) at 09:15

## 2025-02-10 RX ADMIN — IPRATROPIUM BROMIDE AND ALBUTEROL SULFATE 3 ML: 2.5; .5 SOLUTION RESPIRATORY (INHALATION) at 18:34

## 2025-02-10 SDOH — SOCIAL STABILITY: SOCIAL INSECURITY: HAS ANYONE EVER THREATENED TO HURT YOUR FAMILY OR YOUR PETS?: NO

## 2025-02-10 SDOH — ECONOMIC STABILITY: FOOD INSECURITY: WITHIN THE PAST 12 MONTHS, THE FOOD YOU BOUGHT JUST DIDN'T LAST AND YOU DIDN'T HAVE MONEY TO GET MORE.: NEVER TRUE

## 2025-02-10 SDOH — ECONOMIC STABILITY: FOOD INSECURITY: HOW HARD IS IT FOR YOU TO PAY FOR THE VERY BASICS LIKE FOOD, HOUSING, MEDICAL CARE, AND HEATING?: NOT HARD AT ALL

## 2025-02-10 SDOH — SOCIAL STABILITY: SOCIAL INSECURITY
WITHIN THE LAST YEAR, HAVE YOU BEEN RAPED OR FORCED TO HAVE ANY KIND OF SEXUAL ACTIVITY BY YOUR PARTNER OR EX-PARTNER?: NO

## 2025-02-10 SDOH — SOCIAL STABILITY: SOCIAL INSECURITY: DO YOU FEEL ANYONE HAS EXPLOITED OR TAKEN ADVANTAGE OF YOU FINANCIALLY OR OF YOUR PERSONAL PROPERTY?: NO

## 2025-02-10 SDOH — SOCIAL STABILITY: SOCIAL INSECURITY
WITHIN THE LAST YEAR, HAVE YOU BEEN KICKED, HIT, SLAPPED, OR OTHERWISE PHYSICALLY HURT BY YOUR PARTNER OR EX-PARTNER?: NO

## 2025-02-10 SDOH — SOCIAL STABILITY: SOCIAL INSECURITY: ARE THERE ANY APPARENT SIGNS OF INJURIES/BEHAVIORS THAT COULD BE RELATED TO ABUSE/NEGLECT?: NO

## 2025-02-10 SDOH — SOCIAL STABILITY: SOCIAL INSECURITY: DO YOU FEEL UNSAFE GOING BACK TO THE PLACE WHERE YOU ARE LIVING?: NO

## 2025-02-10 SDOH — SOCIAL STABILITY: SOCIAL INSECURITY: WITHIN THE LAST YEAR, HAVE YOU BEEN AFRAID OF YOUR PARTNER OR EX-PARTNER?: NO

## 2025-02-10 SDOH — ECONOMIC STABILITY: HOUSING INSECURITY: IN THE PAST 12 MONTHS, HOW MANY TIMES HAVE YOU MOVED WHERE YOU WERE LIVING?: 1

## 2025-02-10 SDOH — ECONOMIC STABILITY: HOUSING INSECURITY: AT ANY TIME IN THE PAST 12 MONTHS, WERE YOU HOMELESS OR LIVING IN A SHELTER (INCLUDING NOW)?: NO

## 2025-02-10 SDOH — ECONOMIC STABILITY: TRANSPORTATION INSECURITY: IN THE PAST 12 MONTHS, HAS LACK OF TRANSPORTATION KEPT YOU FROM MEDICAL APPOINTMENTS OR FROM GETTING MEDICATIONS?: NO

## 2025-02-10 SDOH — SOCIAL STABILITY: SOCIAL INSECURITY: WERE YOU ABLE TO COMPLETE ALL THE BEHAVIORAL HEALTH SCREENINGS?: YES

## 2025-02-10 SDOH — ECONOMIC STABILITY: FOOD INSECURITY: WITHIN THE PAST 12 MONTHS, YOU WORRIED THAT YOUR FOOD WOULD RUN OUT BEFORE YOU GOT THE MONEY TO BUY MORE.: NEVER TRUE

## 2025-02-10 SDOH — SOCIAL STABILITY: SOCIAL INSECURITY: DOES ANYONE TRY TO KEEP YOU FROM HAVING/CONTACTING OTHER FRIENDS OR DOING THINGS OUTSIDE YOUR HOME?: NO

## 2025-02-10 SDOH — SOCIAL STABILITY: SOCIAL INSECURITY: WITHIN THE LAST YEAR, HAVE YOU BEEN HUMILIATED OR EMOTIONALLY ABUSED IN OTHER WAYS BY YOUR PARTNER OR EX-PARTNER?: NO

## 2025-02-10 SDOH — ECONOMIC STABILITY: HOUSING INSECURITY: IN THE LAST 12 MONTHS, WAS THERE A TIME WHEN YOU WERE NOT ABLE TO PAY THE MORTGAGE OR RENT ON TIME?: NO

## 2025-02-10 SDOH — HEALTH STABILITY: MENTAL HEALTH: HOW MANY DRINKS CONTAINING ALCOHOL DO YOU HAVE ON A TYPICAL DAY WHEN YOU ARE DRINKING?: PATIENT DOES NOT DRINK

## 2025-02-10 SDOH — SOCIAL STABILITY: SOCIAL INSECURITY: ARE YOU OR HAVE YOU BEEN THREATENED OR ABUSED PHYSICALLY, EMOTIONALLY, OR SEXUALLY BY ANYONE?: NO

## 2025-02-10 SDOH — SOCIAL STABILITY: SOCIAL INSECURITY: ABUSE: ADULT

## 2025-02-10 SDOH — HEALTH STABILITY: MENTAL HEALTH: HOW OFTEN DO YOU HAVE SIX OR MORE DRINKS ON ONE OCCASION?: NEVER

## 2025-02-10 SDOH — SOCIAL STABILITY: SOCIAL INSECURITY: HAVE YOU HAD THOUGHTS OF HARMING ANYONE ELSE?: NO

## 2025-02-10 SDOH — HEALTH STABILITY: MENTAL HEALTH: HOW OFTEN DO YOU HAVE A DRINK CONTAINING ALCOHOL?: NEVER

## 2025-02-10 SDOH — ECONOMIC STABILITY: INCOME INSECURITY: IN THE PAST 12 MONTHS HAS THE ELECTRIC, GAS, OIL, OR WATER COMPANY THREATENED TO SHUT OFF SERVICES IN YOUR HOME?: NO

## 2025-02-10 ASSESSMENT — COGNITIVE AND FUNCTIONAL STATUS - GENERAL
MOBILITY SCORE: 24
MOBILITY SCORE: 24
DAILY ACTIVITIY SCORE: 24
PATIENT BASELINE BEDBOUND: NO
DAILY ACTIVITIY SCORE: 24

## 2025-02-10 ASSESSMENT — PAIN - FUNCTIONAL ASSESSMENT
PAIN_FUNCTIONAL_ASSESSMENT: 0-10

## 2025-02-10 ASSESSMENT — ACTIVITIES OF DAILY LIVING (ADL)
LACK_OF_TRANSPORTATION: NO
BATHING: INDEPENDENT
ASSISTIVE_DEVICE: DENTURES UPPER;DENTURES LOWER
FEEDING YOURSELF: INDEPENDENT
HEARING - LEFT EAR: FUNCTIONAL
DRESSING YOURSELF: INDEPENDENT
LACK_OF_TRANSPORTATION: NO
JUDGMENT_ADEQUATE_SAFELY_COMPLETE_DAILY_ACTIVITIES: YES
TOILETING: INDEPENDENT
LACK_OF_TRANSPORTATION: NO
PATIENT'S MEMORY ADEQUATE TO SAFELY COMPLETE DAILY ACTIVITIES?: YES
LACK_OF_TRANSPORTATION: NO
GROOMING: INDEPENDENT
HEARING - RIGHT EAR: FUNCTIONAL
WALKS IN HOME: INDEPENDENT
ADEQUATE_TO_COMPLETE_ADL: YES

## 2025-02-10 ASSESSMENT — COLUMBIA-SUICIDE SEVERITY RATING SCALE - C-SSRS
1. IN THE PAST MONTH, HAVE YOU WISHED YOU WERE DEAD OR WISHED YOU COULD GO TO SLEEP AND NOT WAKE UP?: NO
6. HAVE YOU EVER DONE ANYTHING, STARTED TO DO ANYTHING, OR PREPARED TO DO ANYTHING TO END YOUR LIFE?: NO
2. HAVE YOU ACTUALLY HAD ANY THOUGHTS OF KILLING YOURSELF?: NO

## 2025-02-10 ASSESSMENT — PATIENT HEALTH QUESTIONNAIRE - PHQ9
1. LITTLE INTEREST OR PLEASURE IN DOING THINGS: NOT AT ALL
SUM OF ALL RESPONSES TO PHQ9 QUESTIONS 1 & 2: 0
2. FEELING DOWN, DEPRESSED OR HOPELESS: NOT AT ALL

## 2025-02-10 ASSESSMENT — LIFESTYLE VARIABLES
EVER FELT BAD OR GUILTY ABOUT YOUR DRINKING: NO
HAVE PEOPLE ANNOYED YOU BY CRITICIZING YOUR DRINKING: NO
AUDIT-C TOTAL SCORE: 0
SKIP TO QUESTIONS 9-10: 1
HOW OFTEN DO YOU HAVE A DRINK CONTAINING ALCOHOL: NEVER
HOW MANY STANDARD DRINKS CONTAINING ALCOHOL DO YOU HAVE ON A TYPICAL DAY: PATIENT DOES NOT DRINK
AUDIT-C TOTAL SCORE: 0
AUDIT-C TOTAL SCORE: 0
SKIP TO QUESTIONS 9-10: 1
TOTAL SCORE: 0
HOW OFTEN DO YOU HAVE 6 OR MORE DRINKS ON ONE OCCASION: NEVER
HAVE YOU EVER FELT YOU SHOULD CUT DOWN ON YOUR DRINKING: NO
EVER HAD A DRINK FIRST THING IN THE MORNING TO STEADY YOUR NERVES TO GET RID OF A HANGOVER: NO

## 2025-02-10 ASSESSMENT — PAIN SCALES - GENERAL
PAINLEVEL_OUTOF10: 0 - NO PAIN

## 2025-02-10 NOTE — ED PROVIDER NOTES
HPI   Chief Complaint   Patient presents with   • Shortness of Breath       This is an 79 years old female patient presented to the emergency department with a chief complaint of shortness of breath, cough.  Stated that her symptoms started yesterday around 1900.  Denies any headaches, lightheadedness, dizziness, nasal congestion, sore throat, fever, chills, body aches.  Denies chest pain but reported chest tightness/wheezes.  Denies any history of COPD or bronchial asthma.  Denies any recent long distance travel, drive, calf pain, swelling, recent hospital admission, denies using medication containing estrogen and denies previous PE/DVT.  Denies abdominal pain, diarrhea, constipation, or urinary symptoms.  Patient was given 125 mg of Solu-Medrol in route to the hospital by squad.    Review of system: As stated above in the HPI section.              Patient History   No past medical history on file.  Past Surgical History:   Procedure Laterality Date   • OTHER SURGICAL HISTORY  10/08/2020    Knee replacement   • OTHER SURGICAL HISTORY  03/22/2021    Retinal detachment repair     No family history on file.  Social History     Tobacco Use   • Smoking status: Never   • Smokeless tobacco: Never   Vaping Use   • Vaping status: Never Used   Substance Use Topics   • Alcohol use: Not on file   • Drug use: Not on file       Physical Exam   ED Triage Vitals [02/10/25 0555]   Temperature Heart Rate Respirations BP   36.6 °C (97.9 °F) 92 20 151/85      Pulse Ox Temp src Heart Rate Source Patient Position   95 % -- -- --      BP Location FiO2 (%)     -- --       Physical Exam  Vitals and nursing note reviewed.   Constitutional:       General: She is not in acute distress.     Appearance: She is well-developed.   HENT:      Head: Normocephalic and atraumatic.   Eyes:      Conjunctiva/sclera: Conjunctivae normal.   Cardiovascular:      Rate and Rhythm: Normal rate and regular rhythm.      Heart sounds: No murmur heard.  Pulmonary:       Effort: Tachypnea and accessory muscle usage present. No respiratory distress.      Breath sounds: Examination of the right-upper field reveals wheezing. Examination of the left-upper field reveals wheezing. Examination of the right-middle field reveals wheezing. Examination of the left-middle field reveals wheezing. Examination of the right-lower field reveals wheezing. Examination of the left-lower field reveals wheezing. Wheezing present.   Abdominal:      Palpations: Abdomen is soft.      Tenderness: There is no abdominal tenderness.   Musculoskeletal:         General: No swelling.      Cervical back: Neck supple.   Skin:     General: Skin is warm and dry.      Capillary Refill: Capillary refill takes less than 2 seconds.   Neurological:      Mental Status: She is alert.   Psychiatric:         Mood and Affect: Mood normal.           ED Course & MDM   Diagnoses as of 02/10/25 0623   Shortness of breath                 No data recorded                                 Medical Decision Making  Patient seen and examined, patient received 125 mg of Solu-Medrol en route to the hospital.  Will proceed with administering DuoNeb, and magnesium sulfate.  Patient initial EKG revealed atrial fibrillation with rapid ventricular response with a rate in the 140s, strain pattern in the high lateral precordial leads noted.  Normal QRS, normal QTc duration.    Few minutes later an EKG was repeated and revealed normal sinus rhythm with a rate of 76 bpm, normal VA, QRS, normal QTc duration.  No ST segment or T wave pathology.  Good R wave progression throughout the precordial leads.    Given the lack of constitutional symptoms, patient's age, and lack of history of COPD as well as the physical exam finding we will proceed with obtaining CT chest PE protocol.  Will also obtain cardiac workup.    Will signout the patient care at this point pending labs, imaging, and disposition.      I saw and evaluated the patient. I personally  obtained the key and critical portions of the history and physical exam or was physically present for key and critical portions performed by the resident/fellow. I reviewed the resident/fellow's documentation and discussed the patient with the resident/fellow. I agree with the resident/fellow's medical decision making as documented in the note.    Eliezer Feng DO             Procedure  Procedures     Eliezer Feng DO  02/10/25 0623

## 2025-02-10 NOTE — TELEPHONE ENCOUNTER
JEANNIE....  Pt called and stated she is admitted to [facility] for Breathing issues

## 2025-02-10 NOTE — H&P
History Of Present Illness  Manasa Goel is a 79 y.o. female presenting with Shortness of breath.  Patient reports that yesterday she started feeling a little bit of difficulty breathing and developed a cough.  She states that over the course of the night she became more more short of breath and reported to EMS that she had difficulty laying down.  EMS noted her to be 88% on room air and placed her on 4 L.  She was given DuoNebs and Solu-Medrol with significant report of improvement.  She did have a run of SVT lasting 5 to 10 seconds with EMS.  Currently she was reporting lightheadedness and pain down her left arm.  She is in SVT with rates as high as 220 while sitting on the side of the bed.  I advised her to lay back and take slow deep breaths. Pads are in place.  Rates came down to 140s to 150s with improvement of her both her lightheadedness and left arm discomfort.  Diltiazem drip in the process of being hung.    In the ED vital signs were initially stable as heart rate increased blood pressure did drop as low as 85/48.  Labs are significant for white count of 12.4.  Troponin 5, 7, 7.  Flu COVID and RSV were negative.  VBG showed a pH of 7.35, pCO2 49, pO2 28.  CXR showed no radiographic evidence of acute cardiopulmonary process.  CT PE showed no evidence of PE.  Mild atelectasis/scarring in the lungs without focal consolidation.  She was given 1 L of IV fluids, and DuoNebs and magnesium.  She was given loading dose of diltiazem and started on a drip.  I notified cardiology.  She will be admitted for further evaluation.    DNI, ok with CPR and ICU care      Past Medical History  She has no past medical history on file.    Surgical History  She has a past surgical history that includes Other surgical history (10/08/2020) and Other surgical history (03/22/2021).     Social History  She reports that she has never smoked. She has never used smokeless tobacco. No history on file for alcohol use and drug  use.    Family History  No family history on file.     Allergies  Patient has no known allergies.    Review of Systems   As per HPI     Physical Exam   Constitutional: Well developed, no distress, alert and cooperative  Skin: Warm and dry  Eyes: EOMI, clear sclera  ENMT: mucous membranes moist  Respiratory: bilateral wheezes, mild conversational dyspnea, NC in place   Cardiovascular: tachy  Abdominal: Nondistended, soft, non-tender, +BS  MSK: ROM intact  Neuro: alert and oriented x3    Last Recorded Vitals  /58   Pulse 86   Temp 36.6 °C (97.9 °F) (Temporal)   Resp 16   Wt 60.5 kg (133 lb 6.1 oz)   SpO2 97%     Relevant Results  Results for orders placed or performed during the hospital encounter of 02/10/25 (from the past 24 hours)   ECG 12 lead   Result Value Ref Range    Ventricular Rate 173 BPM    QRS Duration 162 ms    QT Interval 270 ms    QTC Calculation(Bazett) 458 ms    R Axis 41 degrees    T Axis -32 degrees    QRS Count 28 beats    Q Onset 226 ms    T Offset 361 ms    QTC Fredericia 384 ms   CBC and Auto Differential   Result Value Ref Range    WBC 12.4 (H) 4.4 - 11.3 x10*3/uL    nRBC 0.0 0.0 - 0.0 /100 WBCs    RBC 4.06 4.00 - 5.20 x10*6/uL    Hemoglobin 13.6 12.0 - 16.0 g/dL    Hematocrit 41.0 36.0 - 46.0 %     (H) 80 - 100 fL    MCH 33.5 26.0 - 34.0 pg    MCHC 33.2 32.0 - 36.0 g/dL    RDW 13.0 11.5 - 14.5 %    Platelets 277 150 - 450 x10*3/uL    Neutrophils % 72.3 40.0 - 80.0 %    Immature Granulocytes %, Automated 0.3 0.0 - 0.9 %    Lymphocytes % 16.2 13.0 - 44.0 %    Monocytes % 5.6 2.0 - 10.0 %    Eosinophils % 5.1 0.0 - 6.0 %    Basophils % 0.5 0.0 - 2.0 %    Neutrophils Absolute 8.95 (H) 1.60 - 5.50 x10*3/uL    Immature Granulocytes Absolute, Automated 0.04 0.00 - 0.50 x10*3/uL    Lymphocytes Absolute 2.00 0.80 - 3.00 x10*3/uL    Monocytes Absolute 0.69 0.05 - 0.80 x10*3/uL    Eosinophils Absolute 0.63 (H) 0.00 - 0.40 x10*3/uL    Basophils Absolute 0.06 0.00 - 0.10 x10*3/uL    Comprehensive Metabolic Panel   Result Value Ref Range    Glucose 105 (H) 74 - 99 mg/dL    Sodium 140 136 - 145 mmol/L    Potassium 3.8 3.5 - 5.3 mmol/L    Chloride 105 98 - 107 mmol/L    Bicarbonate 28 21 - 32 mmol/L    Anion Gap 11 10 - 20 mmol/L    Urea Nitrogen 23 6 - 23 mg/dL    Creatinine 0.73 0.50 - 1.05 mg/dL    eGFR 83 >60 mL/min/1.73m*2    Calcium 10.1 8.6 - 10.3 mg/dL    Albumin 4.8 3.4 - 5.0 g/dL    Alkaline Phosphatase 120 33 - 136 U/L    Total Protein 7.5 6.4 - 8.2 g/dL    AST 26 9 - 39 U/L    Bilirubin, Total 0.4 0.0 - 1.2 mg/dL    ALT 24 7 - 45 U/L   Magnesium   Result Value Ref Range    Magnesium 2.06 1.60 - 2.40 mg/dL   Sars-CoV-2 and Influenza A/B PCR   Result Value Ref Range    Flu A Result Not Detected Not Detected    Flu B Result Not Detected Not Detected    Coronavirus 2019, PCR Not Detected Not Detected   B-type natriuretic peptide   Result Value Ref Range     (H) 0 - 99 pg/mL   Troponin I, High Sensitivity, Initial   Result Value Ref Range    Troponin I, High Sensitivity 5 0 - 13 ng/L   RSV PCR   Result Value Ref Range    RSV PCR Not Detected Not Detected   Light Blue Top   Result Value Ref Range    Extra Tube Hold for add-ons.    Blood Gas Venous Full Panel   Result Value Ref Range    POCT pH, Venous 7.35 7.33 - 7.43 pH    POCT pCO2, Venous 49 41 - 51 mm Hg    POCT pO2, Venous 28 (L) 35 - 45 mm Hg    POCT SO2, Venous 50 45 - 75 %    POCT Oxy Hemoglobin, Venous 48.7 45.0 - 75.0 %    POCT Hematocrit Calculated, Venous 38.0 36.0 - 46.0 %    POCT Sodium, Venous 137 136 - 145 mmol/L    POCT Potassium, Venous 4.4 3.5 - 5.3 mmol/L    POCT Chloride, Venous 106 98 - 107 mmol/L    POCT Ionized Calicum, Venous 1.33 1.10 - 1.33 mmol/L    POCT Glucose, Venous 103 (H) 74 - 99 mg/dL    POCT Lactate, Venous 1.7 0.4 - 2.0 mmol/L    POCT Base Excess, Venous 0.8 -2.0 - 3.0 mmol/L    POCT HCO3 Calculated, Venous 27.1 (H) 22.0 - 26.0 mmol/L    POCT Hemoglobin, Venous 12.7 12.0 - 16.0 g/dL    POCT Anion  Gap, Venous 8.0 (L) 10.0 - 25.0 mmol/L    Patient Temperature      FiO2 21 %   ECG 12 lead   Result Value Ref Range    Ventricular Rate 76 BPM    Atrial Rate 76 BPM    TN Interval 142 ms    QRS Duration 82 ms    QT Interval 370 ms    QTC Calculation(Bazett) 416 ms    P Axis 69 degrees    R Axis 46 degrees    T Axis 49 degrees    QRS Count 13 beats    Q Onset 227 ms    P Onset 156 ms    P Offset 214 ms    T Offset 412 ms    QTC Fredericia 400 ms   Troponin, High Sensitivity, 1 Hour   Result Value Ref Range    Troponin I, High Sensitivity 7 0 - 13 ng/L   ECG 12 lead   Result Value Ref Range    Ventricular Rate 145 BPM    QRS Duration 72 ms    QT Interval 298 ms    QTC Calculation(Bazett) 462 ms    R Axis 47 degrees    T Axis -5 degrees    QRS Count 23 beats    Q Onset 225 ms    T Offset 374 ms    QTC Fredericia 399 ms   Troponin I, High Sensitivity   Result Value Ref Range    Troponin I, High Sensitivity 7 0 - 13 ng/L   Transthoracic Echo (TTE) Complete   Result Value Ref Range    AV pk jacquelin 1.36 m/s    LV Biplane EF 59 %    LVOT diam 2.15 cm    MV E/A ratio 0.80     Tricuspid annular plane systolic excursion 1.8 cm    LV EF 63 %    RV free wall pk S' 20.00 cm/s    RVSP 32.4 mmHg    LVIDd 4.47 cm    Aortic Valve Area by Continuity of Peak Velocity 3.07 cm2    AV pk grad 7 mmHg    LV A4C EF 60.9      Transthoracic Echo (TTE) Complete    Result Date: 2/10/2025            [facility] [place] [place] [address] [address]    Tel [phone] Fax [phone] TRANSTHORACIC ECHOCARDIOGRAM REPORT Patient Name:       KRISTIN David Physician:    [id number] Pietro Post MD Study Date:         2/10/2025            Ordering Provider:    [id number] BEVERLY FELICIANO MRN/PID:            [id number]             Fellow: Accession#:         [id number]         Nurse: Date of Birth/Age:  1945 /  79 years  Sonographer:          Ilene Akins RDCS Gender Assigned at  F                    Additional Staff: Birth: Height:             157.48 cm            Admit Date: Weight:             58.97 kg             Admission Status:     Inpatient -                                                                Priority                                                                discharge BSA / BMI:          1.59 m2 / 23.78      Department Location:  [facility]                     kg/m2                                      Dept Blood Pressure: 162 /72 mmHg Study Type:    TRANSTHORACIC ECHO (TTE) COMPLETE Diagnosis/ICD: Paroxysmal atrial fibrillation-I48.0 Indication:    Afib CPT Codes:     Echo Complete w Full [id number]  Study Detail: The following Echo studies were performed: 2D, M-Mode, Doppler and               color flow.  PHYSICIAN INTERPRETATION: Left Ventricle: The left ventricular systolic function is normal, with a visually estimated ejection fraction of 60-65%. There is borderline concentric left ventricular hypertrophy. There are no regional wall motion abnormalities. The left ventricular cavity size is normal. There is mild increased septal and normal posterior left ventricular wall thickness. Spectral Doppler shows a Grade I (impaired relaxation pattern) of left ventricular diastolic filling with normal left atrial filling pressure. Left Atrium: The left atrial size is mildly dilated. Right Ventricle: The right ventricle is normal in size. There is normal right ventricular global systolic function. Right Atrium: The right atrial size is normal. Aortic Valve: The aortic valve is trileaflet. There is evidence of mildly elevated transaortic gradients consistent with sclerosis of the aortic valve. There is trace aortic valve regurgitation. The peak instantaneous gradient of the aortic valve is 7 mmHg. Mitral Valve: The mitral valve is  normal in structure. There is mild mitral valve regurgitation. Tricuspid Valve: The tricuspid valve is structurally normal. There is mild tricuspid regurgitation. The Doppler estimated RVSP is within normal limits at 32.4 mmHg. Pulmonic Valve: The pulmonic valve is structurally normal. There is no indication of pulmonic valve regurgitation. Pericardium: No pericardial effusion noted. Aorta: The aortic root is normal. Systemic Veins: The inferior vena cava appears mildly dilated. In comparison to the previous echocardiogram(s): There are no prior studies on this patient for comparison purposes.  CONCLUSIONS:  1. The left ventricular systolic function is normal, with a visually estimated ejection fraction of 60-65%.  2. Spectral Doppler shows a Grade I (impaired relaxation pattern) of left ventricular diastolic filling with normal left atrial filling pressure.  3. Right ventricular systolic pressure is within normal limits.  4. Aortic valve sclerosis.  5. The inferior vena cava appears mildly dilated. QUANTITATIVE DATA SUMMARY:  2D MEASUREMENTS:             Normal Ranges: LAs:             3.74 cm     (2.7-4.0cm) IVSd:            1.12 cm     (0.6-1.1cm) LVPWd:           0.87 cm     (0.6-1.1cm) LVIDd:           4.47 cm     (3.9-5.9cm) LVIDs:           3.02 cm LV Mass Index:   94.5 g/m2 LVEDV Index:     33.88 ml/m2 LV % FS          32.3 %  LEFT ATRIUM:                 Normal Ranges: LA Area A4C:      15.1 cm2 LA Area A2C:      15.9 cm2 LA Volume Index:  25.0 ml/m2 LA Vol A4C:       36.0 ml LA Vol A2C:       38.6 ml LA Vol Index BSA: 23.4 ml/m2  M-MODE MEASUREMENTS:         Normal Ranges: Ao Root:             2.60 cm (2.0-3.7cm) AoV Exc:             1.73 cm (1.5-2.5cm)  AORTA MEASUREMENTS:         Normal Ranges: AoV Exc:            1.73 cm (1.5-2.5cm) Ao Sinus, d:        3.60 cm (2.1-3.5cm) Ao STJ, d:          2.90 cm (1.7-3.4cm) Asc Ao, d:          3.00 cm (2.1-3.4cm)  LV SYSTOLIC FUNCTION:                      Normal  Ranges: EF-A4C View:    61 % (>=55%) EF-A2C View:    62 % EF-Biplane:     59 % EF-Visual:      63 % LV EF Reported: 63 %  LV DIASTOLIC FUNCTION:             Normal Ranges: MV Peak E:             0.49 m/s    (0.7-1.2 m/s) MV Peak A:             0.61 m/s    (0.42-0.7 m/s) E/A Ratio:             0.80        (1.0-2.2) MV e'                  0.100 m/s   (>8.0) MV lateral e'          0.11 m/s MV medial e'           0.09 m/s E/e' Ratio:            4.92        (<8.0) PulmV Sys Bill:         46.22 cm/s PulmV Kil[place]ore Bill:        32.66 cm/s PulmV S/D Bill:         1.42 PulmV A Revs Bill:      27.76 cm/s PulmV A Revs Dur:      105.61 msec  MITRAL VALVE:          Normal Ranges: MV DT:        218 msec (150-240msec)  AORTIC VALVE:           Normal Ranges: AoV Vmax:      1.36 m/s (<=1.7m/s) AoV Peak P.4 mmHg (<20mmHg) LVOT Max Bill:  1.15 m/s (<=1.1m/s) LVOT VTI:      22.08 cm LVOT Diameter: 2.15 cm  (1.8-2.4cm) AoV Area,Vmax: 3.07 cm2 (2.5-4.5cm2)  AORTIC INSUFFICIENCY: AI Vmax:       3.24 m/s AI Half-time:  491 msec AI Decel Time: 1692 msec AI Decel Rate: 205.88 cm/s2  RIGHT VENTRICLE: RV Basal 2.70 cm RV Mid   1.60 cm RV Major 6.0 cm TAPSE:   18.0 mm RV s'    0.20 m/s  TRICUSPID VALVE/RVSP:          Normal Ranges: Peak TR Velocity:     2.47 m/s RV Syst Pressure:     32 mmHg  (< 30mmHg) IVC Diam:             2.18 cm  PULMONIC VALVE:          Normal Ranges: PV Accel Time:  120 msec (>120ms)  PULMONARY VEINS: PulmV A Revs Dur: 105.61 msec PulmV A Revs Bill: 27.76 cm/s PulmV [place] Bill:   32.66 cm/s PulmV S/D Bill:    1.42 PulmV Sys Bill:    46.22 cm/s  [id number] Pietro Post MD Electronically signed on 2/10/2025 at 4:11:27 PM  ** Final **     ECG 12 lead    Result Date: 2/10/2025  Atrial fibrillation with rapid ventricular response with premature ventricular or aberrantly conducted complexes Nonspecific intraventricular block Minimal voltage criteria for LVH, may be normal variant ( Roberto product ) Abnormal ECG When compared with ECG of  10-FEB-2025 06:13, (unconfirmed) Atrial fibrillation has replaced Sinus rhythm Vent. rate has increased BY  97 BPM Questionable change in QRS duration    ECG 12 lead    Result Date: 2/10/2025  Atrial fibrillation with rapid ventricular response with premature ventricular or aberrantly conducted complexes ST depression, consider subendocardial injury Nonspecific T wave abnormality Abnormal ECG When compared with ECG of 07-DEC-2021 11:15, Atrial fibrillation has replaced Sinus rhythm Vent. rate has increased BY  77 BPM ST now depressed in Anterolateral leads T wave inversion now evident in Anterior leads    ECG 12 lead    Result Date: 2/10/2025  Normal sinus rhythm Normal ECG When compared with ECG of 10-FEB-2025 06:00, Sinus rhythm has replaced Atrial fibrillation Vent. rate has decreased BY  69 BPM ST no longer depressed in Anterior leads T wave inversion no longer evident in Anterior leads    CT angio chest for pulmonary embolism    Result Date: 2/10/2025  Interpreted By:  Davon Dunn, STUDY: CT ANGIO CHEST FOR PULMONARY EMBOLISM;  2/10/2025 6:50 am   INDICATION: Signs/Symptoms:shortness of breath, wheezing bilaterally, rule out PE versus pneumonia.     COMPARISON: None.   ACCESSION NUMBER(S): [id number]   ORDERING CLINICIAN: HIRAM DE LEON   TECHNIQUE: Helical data acquisition of the chest was obtained following the intravenous administration of  60 ml of contrast/Omnipaque 350. Images were reformatted in axial, coronal, and sagittal planes. 3D post processing was performed on an independent workstation and volume rendered images of the pulmonary arteries were created and utilized in the interpretation.   FINDINGS: POTENTIAL LIMITATIONS OF THE STUDY:   Respiratory motion artifact.   HEART AND VESSELS: No filling defects are identified within the pulmonary arteries to indicate the presence of a pulmonary embolism.   There are atherosclerotic calcifications of the aorta and its branches. Aorta is normal in course  and caliber. There is prominence of the central pulmonary arteries suggesting pulmonary hypertension.   Heart is enlarged.   No pericardial effusion is seen.   MEDIASTINUM AND JANICE, LOWER NECK AND AXILLA: The visualized thyroid gland is within normal limits.   No evidence of thoracic lymphadenopathy by CT criteria.   Esophagus appears within normal limits as seen. Small hiatal hernia.   LUNGS AND AIRWAYS: The trachea and central airways are patent. No endobronchial lesion.   Mild areas of atelectasis/scarring in the lungs. No focal consolidation. No effusion or pneumothorax.   UPPER ABDOMEN: The visualized subdiaphragmatic structures demonstrate no acute abnormality. Left adrenal nodule measuring approximately 1.6 cm in size, most likely an adenoma given its low attenuation.   CHEST WALL AND OSSEOUS STRUCTURES: Changes. No acute process.       No evidence of a pulmonary embolism. Mild atelectasis/scarring in the lungs without focal consolidation.   MACRO: None.   Signed by: Davon Dunn 2/10/2025 7:30 AM Dictation workstation:   [id number]    XR chest 1 view    Result Date: 2/10/2025  Interpreted By:  Davon Dunn, STUDY: XR CHEST 1 VIEW;  2/10/2025 6:27 am   INDICATION: Signs/Symptoms:Chest Pain.   COMPARISON: 10/23/2024   ACCESSION NUMBER(S): [id number]   ORDERING CLINICIAN: CHRISTIANO ALMENDAREZ   FINDINGS: CHEST/LUNGS: The cardiac and mediastinal silhouettes are unchanged in size and configuration. Mild coarsening of the interstitial markings is unchanged. No focal areas of consolidation are noted. No effusion or pneumothorax is seen.   UPPER ABDOMEN: No remarkable upper abdominal findings.   OSSEOUS STRUCTURES: No acute changes.       No radiographic evidence of an acute cardiopulmonary process.   MACRO: None.   Signed by: Davon Dunn 2/10/2025 7:19 AM Dictation workstation:   [id number]       Assessment/Plan   Assessment & Plan  Acute hypoxic respiratory failure (Multi)    SOB (shortness of breath)    Shortness of  breath  SVT   Hypothyroidism     -SVT up to 200s with symptoms and lightheadedness and L arm pain   -continue dilt drip  -lovenox therapeutic x1   -cardio consulted  -tele moniotring   -f/u echo   -flonase   -duonebs  -prednisone 40mg daily x5d  -wean off supplemental o2 to maintain spo2 >90%  -continue home meds     Dispo: This is an 79-year-old female who presented with acute hypoxic respiratory failure. Found to have SVT.  Started on diltiazem drip.  Cardiology is consulted.  Likely stay greater than 2 midnights.    Felicia Cheek, DO

## 2025-02-10 NOTE — PROGRESS NOTES
Emergency Medicine Transition of Care Note.    I received Manasa Goel in signout from Dr. Block.  Please see the previous ED provider note for all HPI, PE and MDM up to the time of signout at 06:50. This is in addition to the primary record.    In brief Manasa Goel is an 79 y.o. female presenting for   Chief Complaint   Patient presents with    Shortness of Breath     At the time of signout we were awaiting: CT angio chest for PE    ED Course as of 02/10/25 0812   Mon Feb 10, 2025   0743 CT angio chest for pulmonary embolism  No evidence of PE, pleural effusion, consolidation, or infiltrates.  Patient does however continue require oxygen and presented with wheezing and shortness of breath. [ES]      ED Course User Index  [ES] Christopher Durand MD         Diagnoses as of 02/10/25 0812   Shortness of breath   Hypoxia   Paroxysmal atrial fibrillation (Multi)       Medical Decision Making  This is an 79-year-old female evaluated in the ED for shortness of breath and wheezing.  She was said to have DuoNebs and Solu-Medrol en route and placed on oxygen.  No history of COPD.  She desaturates to 85% on room air.  She also had an intermittent episode of A-fib RVR to 145 bpm for less than 1 minute in the ED and was given 2 g of magnesium for this.  She has not been in A-fib RVR since. CT angio chest for PE negative for PE or pneumonia.  Patient will be admitted due to continued oxygen requirement at 2 L NC O2.          Final diagnoses:   [R06.02] Shortness of breath   [R09.02] Hypoxia   [I48.0] Paroxysmal atrial fibrillation (Multi)           Procedure  Procedures    Christopher Durand MD

## 2025-02-10 NOTE — CONSULTS
"Inpatient consult to Cardiology  Consult performed by: Pietro Post MD  Consult ordered by: Felicia Cheek DO  Reason for consult: episode of new onset Afib RVR in ED, rates as high as 145, spontaneously converted NSR        History Of Present Illness  Manasa Goel is a 79 y.o. female pmh HTN, hypothyroidism presenting with shortness of breath and found to be undergoing atrial tachycardia while she was in the emergency department.  At the bedside patient initially reports that she is asymptomatic from her heart rate, reporting that she does not feel any palpitations or that her heart is racing.  Is unaware if she has had any history of tachyarrhythmias in the past.    Shortly after initial bedside encounter, patient continued to have recurrent and sustained atrial tachycardia with rates in the 180s, left arm discomfort, worsening shortness of breath.  She was started on a Cardizem drip with improvement in her symptoms with conversion back to normal sinus rhythm and resolution of her shortness of breath/left arm discomfort.     Past Medical History  She has no past medical history on file.    Surgical History  She has a past surgical history that includes Other surgical history (10/08/2020) and Other surgical history (03/22/2021).     Social History  She reports that she has never smoked. She has never used smokeless tobacco. No history on file for alcohol use and drug use.    Family History  No family history on file.     Allergies  Patient has no known allergies.    Review of Systems  12 system ROS negative except as documented above     Physical Exam  Physical Exam     Visit Vitals  /60   Pulse 91   Temp 36.6 °C (97.9 °F)   Resp 18   Ht 1.575 m (5' 2\")   Wt 59 kg (130 lb)   SpO2 96%   BMI 23.78 kg/m²   Smoking Status Never   BSA 1.61 m²        General: NAD. NCAT. Aox3   Cardiovascular: Initial cardiac exam RRR. No MRG. S1/S2 wnl   Respiratory: CTABL. No acute respiratory distress.   MSK: ROM x 4. CTLS " non-tender.   Extremities: No edema. Cap refill < 2 sec.   Skin: No rashes or bruises.      Last Recorded Vitals  /85   Pulse (!) 131   Temp 36.6 °C (97.9 °F)   Resp 20   Wt 59 kg (130 lb)   SpO2 94%     Relevant Results  Scheduled medications  ipratropium-albuteroL, 3 mL, nebulization, q6h  oxygen, , inhalation, Continuous - Inhalation  perflutren lipid microspheres, 0.5-10 mL of dilution, intravenous, Once in imaging  perflutren protein A microsphere, 0.5 mL, intravenous, Once in imaging  [START ON 2/11/2025] predniSONE, 40 mg, oral, Daily  sulfur hexafluoride microsphr, 2 mL, intravenous, Once in imaging  verapamil, 40 mg, oral, q8h RONY      Continuous medications  dilTIAZem, 5-15 mg/hr, Last Rate: 5 mg/hr (02/10/25 1035)      PRN medications  PRN medications: acetaminophen **OR** acetaminophen **OR** acetaminophen, albuterol, dextromethorphan-guaifenesin, melatonin, ondansetron **OR** ondansetron  Results for orders placed or performed during the hospital encounter of 02/10/25 (from the past 24 hours)   ECG 12 lead   Result Value Ref Range    Ventricular Rate 173 BPM    QRS Duration 162 ms    QT Interval 270 ms    QTC Calculation(Bazett) 458 ms    R Axis 41 degrees    T Axis -32 degrees    QRS Count 28 beats    Q Onset 226 ms    T Offset 361 ms    QTC Fredericia 384 ms   CBC and Auto Differential   Result Value Ref Range    WBC 12.4 (H) 4.4 - 11.3 x10*3/uL    nRBC 0.0 0.0 - 0.0 /100 WBCs    RBC 4.06 4.00 - 5.20 x10*6/uL    Hemoglobin 13.6 12.0 - 16.0 g/dL    Hematocrit 41.0 36.0 - 46.0 %     (H) 80 - 100 fL    MCH 33.5 26.0 - 34.0 pg    MCHC 33.2 32.0 - 36.0 g/dL    RDW 13.0 11.5 - 14.5 %    Platelets 277 150 - 450 x10*3/uL    Neutrophils % 72.3 40.0 - 80.0 %    Immature Granulocytes %, Automated 0.3 0.0 - 0.9 %    Lymphocytes % 16.2 13.0 - 44.0 %    Monocytes % 5.6 2.0 - 10.0 %    Eosinophils % 5.1 0.0 - 6.0 %    Basophils % 0.5 0.0 - 2.0 %    Neutrophils Absolute 8.95 (H) 1.60 - 5.50 x10*3/uL     Immature Granulocytes Absolute, Automated 0.04 0.00 - 0.50 x10*3/uL    Lymphocytes Absolute 2.00 0.80 - 3.00 x10*3/uL    Monocytes Absolute 0.69 0.05 - 0.80 x10*3/uL    Eosinophils Absolute 0.63 (H) 0.00 - 0.40 x10*3/uL    Basophils Absolute 0.06 0.00 - 0.10 x10*3/uL   Comprehensive Metabolic Panel   Result Value Ref Range    Glucose 105 (H) 74 - 99 mg/dL    Sodium 140 136 - 145 mmol/L    Potassium 3.8 3.5 - 5.3 mmol/L    Chloride 105 98 - 107 mmol/L    Bicarbonate 28 21 - 32 mmol/L    Anion Gap 11 10 - 20 mmol/L    Urea Nitrogen 23 6 - 23 mg/dL    Creatinine 0.73 0.50 - 1.05 mg/dL    eGFR 83 >60 mL/min/1.73m*2    Calcium 10.1 8.6 - 10.3 mg/dL    Albumin 4.8 3.4 - 5.0 g/dL    Alkaline Phosphatase 120 33 - 136 U/L    Total Protein 7.5 6.4 - 8.2 g/dL    AST 26 9 - 39 U/L    Bilirubin, Total 0.4 0.0 - 1.2 mg/dL    ALT 24 7 - 45 U/L   Magnesium   Result Value Ref Range    Magnesium 2.06 1.60 - 2.40 mg/dL   Sars-CoV-2 and Influenza A/B PCR   Result Value Ref Range    Flu A Result Not Detected Not Detected    Flu B Result Not Detected Not Detected    Coronavirus 2019, PCR Not Detected Not Detected   B-type natriuretic peptide   Result Value Ref Range     (H) 0 - 99 pg/mL   Troponin I, High Sensitivity, Initial   Result Value Ref Range    Troponin I, High Sensitivity 5 0 - 13 ng/L   RSV PCR   Result Value Ref Range    RSV PCR Not Detected Not Detected   Light Blue Top   Result Value Ref Range    Extra Tube Hold for add-ons.    Blood Gas Venous Full Panel   Result Value Ref Range    POCT pH, Venous 7.35 7.33 - 7.43 pH    POCT pCO2, Venous 49 41 - 51 mm Hg    POCT pO2, Venous 28 (L) 35 - 45 mm Hg    POCT SO2, Venous 50 45 - 75 %    POCT Oxy Hemoglobin, Venous 48.7 45.0 - 75.0 %    POCT Hematocrit Calculated, Venous 38.0 36.0 - 46.0 %    POCT Sodium, Venous 137 136 - 145 mmol/L    POCT Potassium, Venous 4.4 3.5 - 5.3 mmol/L    POCT Chloride, Venous 106 98 - 107 mmol/L    POCT Ionized Calicum, Venous 1.33 1.10 - 1.33  mmol/L    POCT Glucose, Venous 103 (H) 74 - 99 mg/dL    POCT Lactate, Venous 1.7 0.4 - 2.0 mmol/L    POCT Base Excess, Venous 0.8 -2.0 - 3.0 mmol/L    POCT HCO3 Calculated, Venous 27.1 (H) 22.0 - 26.0 mmol/L    POCT Hemoglobin, Venous 12.7 12.0 - 16.0 g/dL    POCT Anion Gap, Venous 8.0 (L) 10.0 - 25.0 mmol/L    Patient Temperature      FiO2 21 %   ECG 12 lead   Result Value Ref Range    Ventricular Rate 76 BPM    Atrial Rate 76 BPM    IA Interval 142 ms    QRS Duration 82 ms    QT Interval 370 ms    QTC Calculation(Bazett) 416 ms    P Axis 69 degrees    R Axis 46 degrees    T Axis 49 degrees    QRS Count 13 beats    Q Onset 227 ms    P Onset 156 ms    P Offset 214 ms    T Offset 412 ms    QTC Fredericia 400 ms   Troponin, High Sensitivity, 1 Hour   Result Value Ref Range    Troponin I, High Sensitivity 7 0 - 13 ng/L   ECG 12 lead   Result Value Ref Range    Ventricular Rate 145 BPM    QRS Duration 72 ms    QT Interval 298 ms    QTC Calculation(Bazett) 462 ms    R Axis 47 degrees    T Axis -5 degrees    QRS Count 23 beats    Q Onset 225 ms    T Offset 374 ms    QTC Fredericia 399 ms   Troponin I, High Sensitivity   Result Value Ref Range    Troponin I, High Sensitivity 7 0 - 13 ng/L     ECG 12 lead    Result Date: 2/10/2025  Atrial fibrillation with rapid ventricular response with premature ventricular or aberrantly conducted complexes Nonspecific intraventricular block Minimal voltage criteria for LVH, may be normal variant ( [place] product ) Abnormal ECG When compared with ECG of 10-FEB-2025 06:13, (unconfirmed) Atrial fibrillation has replaced Sinus rhythm Vent. rate has increased BY  97 BPM Questionable change in QRS duration    ECG 12 lead    Result Date: 2/10/2025  Atrial fibrillation with rapid ventricular response with premature ventricular or aberrantly conducted complexes ST depression, consider subendocardial injury Nonspecific T wave abnormality Abnormal ECG When compared with ECG of 07-DEC-2021 11:15,  Atrial fibrillation has replaced Sinus rhythm Vent. rate has increased BY  77 BPM ST now depressed in Anterolateral leads T wave inversion now evident in Anterior leads    ECG 12 lead    Result Date: 2/10/2025  Normal sinus rhythm Normal ECG When compared with ECG of 10-FEB-2025 06:00, Sinus rhythm has replaced Atrial fibrillation Vent. rate has decreased BY  69 BPM ST no longer depressed in Anterior leads T wave inversion no longer evident in Anterior leads    CT angio chest for pulmonary embolism    Result Date: 2/10/2025  Interpreted By:  Davon Dunn, STUDY: CT ANGIO CHEST FOR PULMONARY EMBOLISM;  2/10/2025 6:50 am   INDICATION: Signs/Symptoms:shortness of breath, wheezing bilaterally, rule out PE versus pneumonia.     COMPARISON: None.   ACCESSION NUMBER(S): [id number]   ORDERING CLINICIAN: HIRAM DE LEON   TECHNIQUE: Helical data acquisition of the chest was obtained following the intravenous administration of  60 ml of contrast/Omnipaque 350. Images were reformatted in axial, coronal, and sagittal planes. 3D post processing was performed on an independent workstation and volume rendered images of the pulmonary arteries were created and utilized in the interpretation.   FINDINGS: POTENTIAL LIMITATIONS OF THE STUDY:   Respiratory motion artifact.   HEART AND VESSELS: No filling defects are identified within the pulmonary arteries to indicate the presence of a pulmonary embolism.   There are atherosclerotic calcifications of the aorta and its branches. Aorta is normal in course and caliber. There is prominence of the central pulmonary arteries suggesting pulmonary hypertension.   Heart is enlarged.   No pericardial effusion is seen.   MEDIASTINUM AND JANICE, LOWER NECK AND AXILLA: The visualized thyroid gland is within normal limits.   No evidence of thoracic lymphadenopathy by CT criteria.   Esophagus appears within normal limits as seen. Small hiatal hernia.   LUNGS AND AIRWAYS: The trachea and central airways  are patent. No endobronchial lesion.   Mild areas of atelectasis/scarring in the lungs. No focal consolidation. No effusion or pneumothorax.   UPPER ABDOMEN: The visualized subdiaphragmatic structures demonstrate no acute abnormality. Left adrenal nodule measuring approximately 1.6 cm in size, most likely an adenoma given its low attenuation.   CHEST WALL AND OSSEOUS STRUCTURES: Changes. No acute process.       No evidence of a pulmonary embolism. Mild atelectasis/scarring in the lungs without focal consolidation.   MACRO: None.   Signed by: Davon Dunn 2/10/2025 7:30 AM Dictation workstation:   [id number]    XR chest 1 view    Result Date: 2/10/2025  Interpreted By:  Davon Dunn, STUDY: XR CHEST 1 VIEW;  2/10/2025 6:27 am   INDICATION: Signs/Symptoms:Chest Pain.   COMPARISON: 10/23/2024   ACCESSION NUMBER(S): [id number]   ORDERING CLINICIAN: CHRISTIANO ALMENDAREZ   FINDINGS: CHEST/LUNGS: The cardiac and mediastinal silhouettes are unchanged in size and configuration. Mild coarsening of the interstitial markings is unchanged. No focal areas of consolidation are noted. No effusion or pneumothorax is seen.   UPPER ABDOMEN: No remarkable upper abdominal findings.   OSSEOUS STRUCTURES: No acute changes.       No radiographic evidence of an acute cardiopulmonary process.   MACRO: None.   Signed by: Davon Dunn 2/10/2025 7:19 AM Dictation workstation:   [id number]         Assessment/Plan     Atrial Tachycardia -on review of EKG there are P waves present which make the diagnosis of atrial fibrillation less likely.  Patient is most likely undergoing atrial tachycardia in the setting of her viral illness.  Initially wanted to attempt rate control with an oral agent such as verapamil, but with recurrent and sustained atrial tachycardia, patient required Cardizem drip for rate control.  She can be started on an oral agent when her rate control is better achieved.   -Continue aspirin 81 mg daily  - Obtain TTE  - She will need to be  discharged with a cardiac event monitor    Discussed with attending physician Benja Rucker D.O.  PGY-2 Internal medicine resident

## 2025-02-10 NOTE — CARE PLAN
Problem: Pain - Adult  Goal: Verbalizes/displays adequate comfort level or baseline comfort level  Outcome: Progressing     Problem: Safety - Adult  Goal: Free from fall injury  Outcome: Progressing     Problem: Discharge Planning  Goal: Discharge to home or other facility with appropriate resources  Outcome: Progressing     Problem: Chronic Conditions and Co-morbidities  Goal: Patient's chronic conditions and co-morbidity symptoms are monitored and maintained or improved  Outcome: Progressing     Problem: Nutrition  Goal: Nutrient intake appropriate for maintaining nutritional needs  Outcome: Progressing     Problem: Arrythmia/Dysrhythmia  Goal: Lab values return to normal range  Outcome: Progressing  Goal: No evidence of post procedure complications  Outcome: Progressing  Goal: Promote self management  Outcome: Progressing  Goal: Serial ECG will return to baseline  Outcome: Progressing  Goal: Vital signs return to baseline  Outcome: Progressing   The patient's goals for the shift include      The clinical goals for the shift include      Patient admitted with shortness of breath. Found to be in afib RVR.  Started on cardizem gtt in ER.  Arrived on cardizem 5mg.  Heart rate less than 80. Gtt stopped at time of arrival.

## 2025-02-11 ENCOUNTER — APPOINTMENT (OUTPATIENT)
Dept: CARDIOLOGY | Facility: HOSPITAL | Age: 80
DRG: 308 | End: 2025-02-11
Payer: MEDICARE

## 2025-02-11 ENCOUNTER — PHARMACY VISIT (OUTPATIENT)
Dept: PHARMACY | Facility: CLINIC | Age: 80
End: 2025-02-11
Payer: COMMERCIAL

## 2025-02-11 VITALS
SYSTOLIC BLOOD PRESSURE: 134 MMHG | HEIGHT: 62 IN | DIASTOLIC BLOOD PRESSURE: 70 MMHG | RESPIRATION RATE: 20 BRPM | OXYGEN SATURATION: 93 % | TEMPERATURE: 96.8 F | BODY MASS INDEX: 24.63 KG/M2 | HEART RATE: 92 BPM | WEIGHT: 133.82 LBS

## 2025-02-11 PROBLEM — R06.02 SHORTNESS OF BREATH: Status: RESOLVED | Noted: 2025-02-10 | Resolved: 2025-02-11

## 2025-02-11 PROBLEM — I47.10 SVT (SUPRAVENTRICULAR TACHYCARDIA) (CMS-HCC): Status: RESOLVED | Noted: 2025-02-11 | Resolved: 2025-02-11

## 2025-02-11 PROBLEM — I47.10 SVT (SUPRAVENTRICULAR TACHYCARDIA) (CMS-HCC): Status: ACTIVE | Noted: 2025-02-11

## 2025-02-11 PROBLEM — R06.02 SOB (SHORTNESS OF BREATH): Status: RESOLVED | Noted: 2025-02-10 | Resolved: 2025-02-11

## 2025-02-11 PROBLEM — J96.01 ACUTE HYPOXIC RESPIRATORY FAILURE (MULTI): Status: RESOLVED | Noted: 2025-02-10 | Resolved: 2025-02-11

## 2025-02-11 LAB
ANION GAP SERPL CALC-SCNC: 12 MMOL/L (ref 10–20)
BODY SURFACE AREA: 1.63 M2
BUN SERPL-MCNC: 26 MG/DL (ref 6–23)
CALCIUM SERPL-MCNC: 9.6 MG/DL (ref 8.6–10.3)
CHLORIDE SERPL-SCNC: 108 MMOL/L (ref 98–107)
CO2 SERPL-SCNC: 26 MMOL/L (ref 21–32)
CREAT SERPL-MCNC: 0.7 MG/DL (ref 0.5–1.05)
EGFRCR SERPLBLD CKD-EPI 2021: 87 ML/MIN/1.73M*2
ERYTHROCYTE [DISTWIDTH] IN BLOOD BY AUTOMATED COUNT: 13.6 % (ref 11.5–14.5)
GLUCOSE BLD MANUAL STRIP-MCNC: 98 MG/DL (ref 74–99)
GLUCOSE SERPL-MCNC: 90 MG/DL (ref 74–99)
HCT VFR BLD AUTO: 32.5 % (ref 36–46)
HGB BLD-MCNC: 10.4 G/DL (ref 12–16)
MAGNESIUM SERPL-MCNC: 2.25 MG/DL (ref 1.6–2.4)
MCH RBC QN AUTO: 32.5 PG (ref 26–34)
MCHC RBC AUTO-ENTMCNC: 32 G/DL (ref 32–36)
MCV RBC AUTO: 102 FL (ref 80–100)
NRBC BLD-RTO: 0 /100 WBCS (ref 0–0)
PHOSPHATE SERPL-MCNC: 3.4 MG/DL (ref 2.5–4.9)
PLATELET # BLD AUTO: 221 X10*3/UL (ref 150–450)
POTASSIUM SERPL-SCNC: 4.2 MMOL/L (ref 3.5–5.3)
RBC # BLD AUTO: 3.2 X10*6/UL (ref 4–5.2)
SODIUM SERPL-SCNC: 142 MMOL/L (ref 136–145)
WBC # BLD AUTO: 10.9 X10*3/UL (ref 4.4–11.3)

## 2025-02-11 PROCEDURE — RXMED WILLOW AMBULATORY MEDICATION CHARGE

## 2025-02-11 PROCEDURE — 2500000001 HC RX 250 WO HCPCS SELF ADMINISTERED DRUGS (ALT 637 FOR MEDICARE OP): Performed by: STUDENT IN AN ORGANIZED HEALTH CARE EDUCATION/TRAINING PROGRAM

## 2025-02-11 PROCEDURE — 2500000002 HC RX 250 W HCPCS SELF ADMINISTERED DRUGS (ALT 637 FOR MEDICARE OP, ALT 636 FOR OP/ED): Performed by: STUDENT IN AN ORGANIZED HEALTH CARE EDUCATION/TRAINING PROGRAM

## 2025-02-11 PROCEDURE — 84100 ASSAY OF PHOSPHORUS: CPT | Performed by: STUDENT IN AN ORGANIZED HEALTH CARE EDUCATION/TRAINING PROGRAM

## 2025-02-11 PROCEDURE — 94640 AIRWAY INHALATION TREATMENT: CPT

## 2025-02-11 PROCEDURE — 36415 COLL VENOUS BLD VENIPUNCTURE: CPT | Performed by: STUDENT IN AN ORGANIZED HEALTH CARE EDUCATION/TRAINING PROGRAM

## 2025-02-11 PROCEDURE — 82947 ASSAY GLUCOSE BLOOD QUANT: CPT

## 2025-02-11 PROCEDURE — 2500000005 HC RX 250 GENERAL PHARMACY W/O HCPCS

## 2025-02-11 PROCEDURE — 2500000001 HC RX 250 WO HCPCS SELF ADMINISTERED DRUGS (ALT 637 FOR MEDICARE OP): Performed by: INTERNAL MEDICINE

## 2025-02-11 PROCEDURE — 2500000004 HC RX 250 GENERAL PHARMACY W/ HCPCS (ALT 636 FOR OP/ED): Performed by: STUDENT IN AN ORGANIZED HEALTH CARE EDUCATION/TRAINING PROGRAM

## 2025-02-11 PROCEDURE — 99232 SBSQ HOSP IP/OBS MODERATE 35: CPT

## 2025-02-11 PROCEDURE — 83735 ASSAY OF MAGNESIUM: CPT | Performed by: STUDENT IN AN ORGANIZED HEALTH CARE EDUCATION/TRAINING PROGRAM

## 2025-02-11 PROCEDURE — 99239 HOSP IP/OBS DSCHRG MGMT >30: CPT | Performed by: STUDENT IN AN ORGANIZED HEALTH CARE EDUCATION/TRAINING PROGRAM

## 2025-02-11 PROCEDURE — 85027 COMPLETE CBC AUTOMATED: CPT | Performed by: STUDENT IN AN ORGANIZED HEALTH CARE EDUCATION/TRAINING PROGRAM

## 2025-02-11 PROCEDURE — 93246 EXT ECG>7D<15D RECORDING: CPT

## 2025-02-11 PROCEDURE — 80048 BASIC METABOLIC PNL TOTAL CA: CPT | Performed by: STUDENT IN AN ORGANIZED HEALTH CARE EDUCATION/TRAINING PROGRAM

## 2025-02-11 RX ORDER — ALBUTEROL SULFATE 90 UG/1
2 INHALANT RESPIRATORY (INHALATION) EVERY 4 HOURS PRN
Qty: 8.5 G | Refills: 0 | Status: SHIPPED | OUTPATIENT
Start: 2025-02-11

## 2025-02-11 RX ORDER — VERAPAMIL HCL 240 MG
240 TABLET, EXTENDED RELEASE ORAL NIGHTLY
Qty: 30 TABLET | Refills: 0 | Status: SHIPPED | OUTPATIENT
Start: 2025-02-11 | End: 2025-02-11

## 2025-02-11 RX ORDER — VERAPAMIL HYDROCHLORIDE 80 MG/1
80 TABLET ORAL EVERY 8 HOURS SCHEDULED
Qty: 2 TABLET | Refills: 0 | Status: SHIPPED | OUTPATIENT
Start: 2025-02-11 | End: 2025-02-12

## 2025-02-11 RX ORDER — PREDNISONE 20 MG/1
40 TABLET ORAL DAILY
Qty: 8 TABLET | Refills: 0 | Status: SHIPPED | OUTPATIENT
Start: 2025-02-12 | End: 2025-02-16

## 2025-02-11 RX ORDER — VERAPAMIL HCL 240 MG
240 TABLET, EXTENDED RELEASE ORAL DAILY
Qty: 30 TABLET | Refills: 0 | Status: SHIPPED | OUTPATIENT
Start: 2025-02-11 | End: 2025-03-13

## 2025-02-11 RX ORDER — GUAIFENESIN/DEXTROMETHORPHAN 100-10MG/5
10 SYRUP ORAL EVERY 4 HOURS PRN
Qty: 118 ML | Refills: 0 | Status: SHIPPED | OUTPATIENT
Start: 2025-02-11

## 2025-02-11 RX ADMIN — LEVOTHYROXINE SODIUM 125 MCG: 125 TABLET ORAL at 08:41

## 2025-02-11 RX ADMIN — LISINOPRIL 20 MG: 20 TABLET ORAL at 08:41

## 2025-02-11 RX ADMIN — VERAPAMIL HYDROCHLORIDE 80 MG: 40 TABLET, FILM COATED ORAL at 14:05

## 2025-02-11 RX ADMIN — APIXABAN 5 MG: 5 TABLET, FILM COATED ORAL at 08:41

## 2025-02-11 RX ADMIN — VERAPAMIL HYDROCHLORIDE 80 MG: 40 TABLET, FILM COATED ORAL at 05:23

## 2025-02-11 RX ADMIN — GUAIFENESIN AND DEXTROMETHORPHAN 5 ML: 20; 200 SYRUP ORAL at 12:22

## 2025-02-11 RX ADMIN — IPRATROPIUM BROMIDE AND ALBUTEROL SULFATE 3 ML: 2.5; .5 SOLUTION RESPIRATORY (INHALATION) at 11:12

## 2025-02-11 RX ADMIN — Medication 2 L/MIN: at 08:00

## 2025-02-11 RX ADMIN — PREDNISONE 40 MG: 20 TABLET ORAL at 08:41

## 2025-02-11 SDOH — ECONOMIC STABILITY: FOOD INSECURITY: WITHIN THE PAST 12 MONTHS, YOU WORRIED THAT YOUR FOOD WOULD RUN OUT BEFORE YOU GOT THE MONEY TO BUY MORE.: NEVER TRUE

## 2025-02-11 SDOH — ECONOMIC STABILITY: HOUSING INSECURITY: AT ANY TIME IN THE PAST 12 MONTHS, WERE YOU HOMELESS OR LIVING IN A SHELTER (INCLUDING NOW)?: NO

## 2025-02-11 SDOH — ECONOMIC STABILITY: FOOD INSECURITY: HOW HARD IS IT FOR YOU TO PAY FOR THE VERY BASICS LIKE FOOD, HOUSING, MEDICAL CARE, AND HEATING?: NOT HARD AT ALL

## 2025-02-11 SDOH — ECONOMIC STABILITY: INCOME INSECURITY: IN THE PAST 12 MONTHS HAS THE ELECTRIC, GAS, OIL, OR WATER COMPANY THREATENED TO SHUT OFF SERVICES IN YOUR HOME?: NO

## 2025-02-11 SDOH — ECONOMIC STABILITY: HOUSING INSECURITY: IN THE LAST 12 MONTHS, WAS THERE A TIME WHEN YOU WERE NOT ABLE TO PAY THE MORTGAGE OR RENT ON TIME?: NO

## 2025-02-11 SDOH — ECONOMIC STABILITY: TRANSPORTATION INSECURITY: IN THE PAST 12 MONTHS, HAS LACK OF TRANSPORTATION KEPT YOU FROM MEDICAL APPOINTMENTS OR FROM GETTING MEDICATIONS?: NO

## 2025-02-11 SDOH — ECONOMIC STABILITY: FOOD INSECURITY: WITHIN THE PAST 12 MONTHS, THE FOOD YOU BOUGHT JUST DIDN'T LAST AND YOU DIDN'T HAVE MONEY TO GET MORE.: NEVER TRUE

## 2025-02-11 SDOH — ECONOMIC STABILITY: HOUSING INSECURITY: IN THE PAST 12 MONTHS, HOW MANY TIMES HAVE YOU MOVED WHERE YOU WERE LIVING?: 0

## 2025-02-11 ASSESSMENT — PAIN - FUNCTIONAL ASSESSMENT
PAIN_FUNCTIONAL_ASSESSMENT: 0-10

## 2025-02-11 ASSESSMENT — COGNITIVE AND FUNCTIONAL STATUS - GENERAL
MOBILITY SCORE: 24
DAILY ACTIVITIY SCORE: 24

## 2025-02-11 ASSESSMENT — PAIN SCALES - GENERAL
PAINLEVEL_OUTOF10: 0 - NO PAIN

## 2025-02-11 ASSESSMENT — ACTIVITIES OF DAILY LIVING (ADL)
LACK_OF_TRANSPORTATION: NO
LACK_OF_TRANSPORTATION: NO

## 2025-02-11 NOTE — PROGRESS NOTES
Spiritual Care Visit  Spiritual Care Request    Reason for Visit:  Routine Visit: Introduction     Request Received From:       Focus of Care:  Visited With: Patient         Refer to :          Spiritual Care Assessment    Spiritual Assessment:                      Care Provided:  Intended Effects: Promote sense of peace, Preserve dignity and respect, Meaning-making  Methods: Offer spiritual/Mormon support  Interventions: Share words of hope and inspiration, [place]    Sense of Community and or Alevism Affiliation:  Jain         Addressed Needs/Concerns and/or [place] Through:          Outcome:        Advance Directives:         Spiritual Care Annotation    Annotation:   prayed at the patient's request.

## 2025-02-11 NOTE — DISCHARGE INSTRUCTIONS
-Your medications have changed, please review your list carefully.  New prescriptions were sent to your pharmacy  -You are being sent home on a cardiac event monitor for 14 days, please follow the instructions to return upon completion  -Please follow-up with your primary care provider in 7 to 10 days, please call to schedule  -Please follow-up with cardiology Dr. Post in 1 month as scheduled

## 2025-02-11 NOTE — PROGRESS NOTES
Physical Therapy                       Therapy Communication Note    Patient Name: Manasa Goel  MRN: [id number]  Today's Date: 2/11/2025     Discipline: Physical Therapy    Missed Visit Reason: PT order received, chart reviewed. Spoke with nursing and pt. Pt has been ambulating independently in room and feels that she is near her baseline level of function. No skilled PT needs at this time; will sign off.     Missed Time: Attempt

## 2025-02-11 NOTE — PROGRESS NOTES
"Manasa Goel is a 79 y.o. female on day 1 of admission presenting with Acute hypoxic respiratory failure (Multi).    Subjective   Feels much better, no chest pain or palpitations noted.       Objective     Physical Exam  General: NAD. NCAT. Aox3   Cardiovascular: Initial cardiac exam RRR. No MRG. S1/S2 wnl   Respiratory: CTABL. No acute respiratory distress.   MSK: ROM x 4. CTLS non-tender.   Extremities: No edema. Cap refill < 2 sec.   Skin: No rashes or bruises.  Last Recorded Vitals  Blood pressure 147/57, pulse 70, temperature 36.5 °C (97.7 °F), temperature source Temporal, resp. rate 18, height 1.575 m (5' 2\"), weight 60.7 kg (133 lb 13.1 oz), SpO2 95%.  Intake/Output last 3 Shifts:  No intake/output data recorded.    Relevant Results            This patient currently has cardiac telemetry ordered; if you would like to modify or discontinue the telemetry order, click here to go to the orders activity to modify/discontinue the order.    Scheduled medications  apixaban, 5 mg, oral, BID  fluticasone, 1 spray, Each Nostril, Daily  ipratropium-albuteroL, 3 mL, nebulization, TID  levothyroxine, 125 mcg, oral, Daily  lisinopril, 20 mg, oral, Daily  oxygen, , inhalation, Continuous - Inhalation  perflutren lipid microspheres, 0.5-10 mL of dilution, intravenous, Once in imaging  perflutren protein A microsphere, 0.5 mL, intravenous, Once in imaging  predniSONE, 40 mg, oral, Daily  sulfur hexafluoride microsphr, 2 mL, intravenous, Once in imaging  verapamil, 80 mg, oral, q8h RONY      Continuous medications     PRN medications  PRN medications: acetaminophen **OR** acetaminophen **OR** acetaminophen, albuterol, dextromethorphan-guaifenesin, melatonin, ondansetron **OR** ondansetron  Results for orders placed or performed during the hospital encounter of 02/10/25 (from the past 24 hours)   Transthoracic Echo (TTE) Complete   Result Value Ref Range    AV pk jacquelin 1.36 m/s    LV Biplane EF 59 %    LVOT diam 2.15 cm    MV E/A " ratio 0.80     Tricuspid annular plane systolic excursion 1.8 cm    LV EF 63 %    RV free wall pk S' 20.00 cm/s    RVSP 32.4 mmHg    LVIDd 4.47 cm    Aortic Valve Area by Continuity of Peak Velocity 3.07 cm2    AV pk grad 7 mmHg    LV A4C EF 60.9    CBC   Result Value Ref Range    WBC 10.9 4.4 - 11.3 x10*3/uL    nRBC 0.0 0.0 - 0.0 /100 WBCs    RBC 3.20 (L) 4.00 - 5.20 x10*6/uL    Hemoglobin 10.4 (L) 12.0 - 16.0 g/dL    Hematocrit 32.5 (L) 36.0 - 46.0 %     (H) 80 - 100 fL    MCH 32.5 26.0 - 34.0 pg    MCHC 32.0 32.0 - 36.0 g/dL    RDW 13.6 11.5 - 14.5 %    Platelets 221 150 - 450 x10*3/uL   Basic metabolic panel   Result Value Ref Range    Glucose 90 74 - 99 mg/dL    Sodium 142 136 - 145 mmol/L    Potassium 4.2 3.5 - 5.3 mmol/L    Chloride 108 (H) 98 - 107 mmol/L    Bicarbonate 26 21 - 32 mmol/L    Anion Gap 12 10 - 20 mmol/L    Urea Nitrogen 26 (H) 6 - 23 mg/dL    Creatinine 0.70 0.50 - 1.05 mg/dL    eGFR 87 >60 mL/min/1.73m*2    Calcium 9.6 8.6 - 10.3 mg/dL   Magnesium   Result Value Ref Range    Magnesium 2.25 1.60 - 2.40 mg/dL   Phosphorus   Result Value Ref Range    Phosphorus 3.4 2.5 - 4.9 mg/dL   POCT GLUCOSE   Result Value Ref Range    POCT Glucose 98 74 - 99 mg/dL     Transthoracic Echo (TTE) Complete    Result Date: 2/10/2025            [facility] [address], [place] [id number]    Tel [phone] Fax [phone] TRANSTHORACIC ECHOCARDIOGRAM REPORT Patient Name:       KRISTIN KRISTIN David Physician:    [id number] Pietro Post MD Study Date:         2/10/2025            Ordering Provider:    [id number] BEVERLY FELICIANO MRN/PID:            [id number]             Fellow: Accession#:         [id number]         Nurse: Date of Birth/Age:  1945 / 79 years  Sonographer:          Ilene Akins RDCS  Gender Assigned at  F                    Additional Staff: Birth: Height:             157.48 cm            Admit Date: Weight:             58.97 kg             Admission Status:     Inpatient -                                                                Priority                                                                discharge BSA / BMI:          1.59 m2 / 23.78      Department Location:  [facility]                     kg/m2                                      Dept Blood Pressure: 162 /72 mmHg Study Type:    TRANSTHORACIC ECHO (TTE) COMPLETE Diagnosis/ICD: Paroxysmal atrial fibrillation-I48.0 Indication:    Afib CPT Codes:     Echo Complete w Full Doppler-[id number]  Study Detail: The following Echo studies were performed: 2D, M-Mode, Doppler and               color flow.  PHYSICIAN INTERPRETATION: Left Ventricle: The left ventricular systolic function is normal, with a visually estimated ejection fraction of 60-65%. There is borderline concentric left ventricular hypertrophy. There are no regional wall motion abnormalities. The left ventricular cavity size is normal. There is mild increased septal and normal posterior left ventricular wall thickness. Spectral Doppler shows a Grade I (impaired relaxation pattern) of left ventricular diastolic filling with normal left atrial filling pressure. Left Atrium: The left atrial size is mildly dilated. Right Ventricle: The right ventricle is normal in size. There is normal right ventricular global systolic function. Right Atrium: The right atrial size is normal. Aortic Valve: The aortic valve is trileaflet. There is evidence of mildly elevated transaortic gradients consistent with sclerosis of the aortic valve. There is trace aortic valve regurgitation. The peak instantaneous gradient of the aortic valve is 7 mmHg. Mitral Valve: The mitral valve is normal in structure. There is mild mitral valve regurgitation. Tricuspid Valve: The tricuspid valve is structurally  normal. There is mild tricuspid regurgitation. The Doppler estimated RVSP is within normal limits at 32.4 mmHg. Pulmonic Valve: The pulmonic valve is structurally normal. There is no indication of pulmonic valve regurgitation. Pericardium: No pericardial effusion noted. Aorta: The aortic root is normal. Systemic Veins: The inferior vena cava appears mildly dilated. In comparison to the previous echocardiogram(s): There are no prior studies on this patient for comparison purposes.  CONCLUSIONS:  1. The left ventricular systolic function is normal, with a visually estimated ejection fraction of 60-65%.  2. Spectral Doppler shows a Grade I (impaired relaxation pattern) of left ventricular diastolic filling with normal left atrial filling pressure.  3. Right ventricular systolic pressure is within normal limits.  4. Aortic valve sclerosis.  5. The inferior vena cava appears mildly dilated. QUANTITATIVE DATA SUMMARY:  2D MEASUREMENTS:             Normal Ranges: LAs:             3.74 cm     (2.7-4.0cm) IVSd:            1.12 cm     (0.6-1.1cm) LVPWd:           0.87 cm     (0.6-1.1cm) LVIDd:           4.47 cm     (3.9-5.9cm) LVIDs:           3.02 cm LV Mass Index:   94.5 g/m2 LVEDV Index:     33.88 ml/m2 LV % FS          32.3 %  LEFT ATRIUM:                 Normal Ranges: LA Area A4C:      15.1 cm2 LA Area A2C:      15.9 cm2 LA Volume Index:  25.0 ml/m2 LA Vol A4C:       36.0 ml LA Vol A2C:       38.6 ml LA Vol Index BSA: 23.4 ml/m2  M-MODE MEASUREMENTS:         Normal Ranges: Ao Root:             2.60 cm (2.0-3.7cm) AoV Exc:             1.73 cm (1.5-2.5cm)  AORTA MEASUREMENTS:         Normal Ranges: AoV Exc:            1.73 cm (1.5-2.5cm) Ao Sinus, d:        3.60 cm (2.1-3.5cm) Ao STJ, d:          2.90 cm (1.7-3.4cm) Asc Ao, d:          3.00 cm (2.1-3.4cm)  LV SYSTOLIC FUNCTION:                      Normal Ranges: EF-A4C View:    61 % (>=55%) EF-A2C View:    62 % EF-Biplane:     59 % EF-Visual:      63 % LV EF Reported: 63  %  LV DIASTOLIC FUNCTION:             Normal Ranges: MV Peak E:             0.49 m/s    (0.7-1.2 m/s) MV Peak A:             0.61 m/s    (0.42-0.7 m/s) E/A Ratio:             0.80        (1.0-2.2) MV e'                  0.100 m/s   (>8.0) MV lateral e'          0.11 m/s MV medial e'           0.09 m/s E/e' Ratio:            4.92        (<8.0) PulmV Sys Bill:         46.22 cm/s PulmV [place] Bill:        32.66 cm/s PulmV S/D Bill:         1.42 PulmV A Revs Bill:      27.76 cm/s PulmV A Revs Dur:      105.61 msec  MITRAL VALVE:          Normal Ranges: MV DT:        218 msec (150-240msec)  AORTIC VALVE:           Normal Ranges: AoV Vmax:      1.36 m/s (<=1.7m/s) AoV Peak P.4 mmHg (<20mmHg) LVOT Max Bill:  1.15 m/s (<=1.1m/s) LVOT VTI:      22.08 cm LVOT Diameter: 2.15 cm  (1.8-2.4cm) AoV Area,Vmax: 3.07 cm2 (2.5-4.5cm2)  AORTIC INSUFFICIENCY: AI Vmax:       3.24 m/s AI Half-time:  491 msec AI Decel Time: 1692 msec AI Decel Rate: 205.88 cm/s2  RIGHT VENTRICLE: RV Basal 2.70 cm RV Mid   1.60 cm RV Major 6.0 cm TAPSE:   18.0 mm RV s'    0.20 m/s  TRICUSPID VALVE/RVSP:          Normal Ranges: Peak TR Velocity:     2.47 m/s RV Syst Pressure:     32 mmHg  (< 30mmHg) IVC Diam:             2.18 cm  PULMONIC VALVE:          Normal Ranges: PV Accel Time:  120 msec (>120ms)  PULMONARY VEINS: PulmV A Revs Dur: 105.61 msec PulmV A Revs Bill: 27.76 cm/s PulmV [place] Bill:   32.66 cm/s PulmV S/D Bill:    1.42 PulmV Sys Bill:    46.22 cm/s  [id number] Pietro Post MD Electronically signed on 2/10/2025 at 4:11:27 PM  ** Final **     ECG 12 lead    Result Date: 2/10/2025  Atrial fibrillation with rapid ventricular response with premature ventricular or aberrantly conducted complexes Nonspecific intraventricular block Minimal voltage criteria for LVH, may be normal variant ( Boley product ) Abnormal ECG When compared with ECG of 10-FEB-2025 06:13, (unconfirmed) Atrial fibrillation has replaced Sinus rhythm Vent. rate has increased BY  97 BPM  Questionable change in QRS duration    ECG 12 lead    Result Date: 2/10/2025  Atrial fibrillation with rapid ventricular response with premature ventricular or aberrantly conducted complexes ST depression, consider subendocardial injury Nonspecific T wave abnormality Abnormal ECG When compared with ECG of 07-DEC-2021 11:15, Atrial fibrillation has replaced Sinus rhythm Vent. rate has increased BY  77 BPM ST now depressed in Anterolateral leads T wave inversion now evident in Anterior leads    ECG 12 lead    Result Date: 2/10/2025  Normal sinus rhythm Normal ECG When compared with ECG of 10-FEB-2025 06:00, Sinus rhythm has replaced Atrial fibrillation Vent. rate has decreased BY  69 BPM ST no longer depressed in Anterior leads T wave inversion no longer evident in Anterior leads    CT angio chest for pulmonary embolism    Result Date: 2/10/2025  Interpreted By:  Davon Dunn, STUDY: CT ANGIO CHEST FOR PULMONARY EMBOLISM;  2/10/2025 6:50 am   INDICATION: Signs/Symptoms:shortness of breath, wheezing bilaterally, rule out PE versus pneumonia.     COMPARISON: None.   ACCESSION NUMBER(S): [id number]   ORDERING CLINICIAN: HIRAM DE LEON   TECHNIQUE: Helical data acquisition of the chest was obtained following the intravenous administration of  60 ml of contrast/Omnipaque 350. Images were reformatted in axial, coronal, and sagittal planes. 3D post processing was performed on an independent workstation and volume rendered images of the pulmonary arteries were created and utilized in the interpretation.   FINDINGS: POTENTIAL LIMITATIONS OF THE STUDY:   Respiratory motion artifact.   HEART AND VESSELS: No filling defects are identified within the pulmonary arteries to indicate the presence of a pulmonary embolism.   There are atherosclerotic calcifications of the aorta and its branches. Aorta is normal in course and caliber. There is prominence of the central pulmonary arteries suggesting pulmonary hypertension.   Heart is  enlarged.   No pericardial effusion is seen.   MEDIASTINUM AND JANICE, LOWER NECK AND AXILLA: The visualized thyroid gland is within normal limits.   No evidence of thoracic lymphadenopathy by CT criteria.   Esophagus appears within normal limits as seen. Small hiatal hernia.   LUNGS AND AIRWAYS: The trachea and central airways are patent. No endobronchial lesion.   Mild areas of atelectasis/scarring in the lungs. No focal consolidation. No effusion or pneumothorax.   UPPER ABDOMEN: The visualized subdiaphragmatic structures demonstrate no acute abnormality. Left adrenal nodule measuring approximately 1.6 cm in size, most likely an adenoma given its low attenuation.   CHEST WALL AND OSSEOUS STRUCTURES: Changes. No acute process.       No evidence of a pulmonary embolism. Mild atelectasis/scarring in the lungs without focal consolidation.   MACRO: None.   Signed by: Davon Dunn 2/10/2025 7:30 AM Dictation workstation:   [id number]    XR chest 1 view    Result Date: 2/10/2025  Interpreted By:  Davon Dunn, STUDY: XR CHEST 1 VIEW;  2/10/2025 6:27 am   INDICATION: Signs/Symptoms:Chest Pain.   COMPARISON: 10/23/2024   ACCESSION NUMBER(S): [id number]   ORDERING CLINICIAN: CHRISTIANO ALMENDAREZ   FINDINGS: CHEST/LUNGS: The cardiac and mediastinal silhouettes are unchanged in size and configuration. Mild coarsening of the interstitial markings is unchanged. No focal areas of consolidation are noted. No effusion or pneumothorax is seen.   UPPER ABDOMEN: No remarkable upper abdominal findings.   OSSEOUS STRUCTURES: No acute changes.       No radiographic evidence of an acute cardiopulmonary process.   MACRO: None.   Signed by: Davon Dunn 2/10/2025 7:19 AM Dictation workstation:   [id number]                 Assessment/Plan   Assessment & Plan  Acute hypoxic respiratory failure (Multi)    SOB (shortness of breath)    Shortness of breath    Atrial tachycardia/SVT/Atrial fibrillation - Good rhythm control on the verapimil. Can transition  to long acting verapimil on discharge. Recommend 240 mg daily dosing.   - Continue aspirin 81 mg daily  - can continue eliquis on discharge, will need anticoagulation for at least one months.   -  TTE reviewed  - She will need to be discharged with a cardiac event monitor  - Can follow up with Dr. Post in 1 month for re evaluation.      Discussed with attending physician Benja Rucker D.O.  PGY-2 Internal medicine resident

## 2025-02-11 NOTE — PROGRESS NOTES
02/11/25 1113   Discharge Planning   Living Arrangements Alone   Support Systems Children;Family members   Assistance Needed none   Type of Residence Private residence   Number of Stairs to Enter Residence 1   Number of Stairs Within Residence 12  (split level- 8 up and 4 down)   Do you have animals or pets at home? No   Who is requesting discharge planning? Provider   Home or Post Acute Services None   Expected Discharge Disposition Home   Does the patient need discharge transport arranged? No   Financial Resource Strain   How hard is it for you to pay for the very basics like food, housing, medical care, and heating? Not hard   Housing Stability   In the last 12 months, was there a time when you were not able to pay the mortgage or rent on time? N   In the past 12 months, how many times have you moved where you were living? 0   At any time in the past 12 months, were you homeless or living in a shelter (including now)? N   Transportation Needs   In the past 12 months, has lack of transportation kept you from medical appointments or from getting medications? no   In the past 12 months, has lack of transportation kept you from meetings, work, or from getting things needed for daily living? No   Patient Choice   Patient / Family choosing to utilize agency / facility established prior to hospitalization No   Stroke Family Assessment   Stroke Family Assessment Needed No   Intensity of Service   Intensity of Service 0-30 min     Met with patient at bedside. Introduced self and role in hospital. Verified address and PCP is Dr. Marcelino Hammer. Uses [facility] in [place] for medications and has no issues obtaining/paying for them and manages her own medications. Patient does not use any ambulation devices, still drives, works full time, does her own grocery shopping and is independent with ADL's. Denies any issues paying bills or getting food into the home. Patient feels that she is able to manage her health and  will return home on discharge with no needs. CT team to follow patient for discharge needs.

## 2025-02-11 NOTE — DISCHARGE SUMMARY
Discharge Diagnosis  Acute hypoxic respiratory failure (Multi)    Issues Requiring Follow-Up  Arrhythmia, SVT vs afib  URI     Discharge Meds     Medication List      START taking these medications     albuterol 90 mcg/actuation inhaler; Commonly known as: Ventolin HFA;   Inhale 2 puffs every 4 hours if needed for wheezing or shortness of   breath.   apixaban 5 mg tablet; Commonly known as: Eliquis; Take 1 tablet (5 mg)   by mouth 2 times a day.   dextromethorphan-guaifenesin  mg/5 mL oral liquid; Commonly known   as: Robitussin DM; Take 10 mL by mouth every 4 hours if needed for cough.   predniSONE 20 mg tablet; Commonly known as: Deltasone; Take 2 tablets   (40 mg) by mouth once daily for 4 doses. Start 2/12; Start taking on:   February 12, 2025   verapamil  mg ER tablet; Commonly known as: Calan-SR; Take 1   tablet (240 mg) by mouth once daily at bedtime. Do not crush or chew.     CONTINUE taking these medications     fluticasone 50 mcg/actuation nasal spray; Commonly known as: Flonase;   Administer 1 spray into each nostril once daily. Shake gently. Before   first use, prime pump. After use, clean tip and replace cap.   levothyroxine 125 mcg tablet; Commonly known as: Synthroid, Levoxyl;   Take 1 tablet (125 mcg) by mouth once daily.   lisinopril 20 mg tablet; Take 1 tablet (20 mg) by mouth once daily.   PreserVision AREDS 2 Plus  mcg-15 mcg- 5 mg-1 mg capsule; Generic   drug: mv-min-FA-vit K-lutein-zeaxant     STOP taking these medications     butalbital-acetaminophen-caff -40 mg tablet       Test Results Pending At Discharge  Pending Labs       No current pending labs.            Hospital Course    This is an 79-year-old female who presented with acute hypoxic respiratory failure. Found to have SVT on the telemetry monitor with rates as high as 200s.  Pt was started on diltiazem drip with significant improvement in symptoms and converted to NSR.  Cardiology was consulted. Pt started on  verapamil. She was weaned off O2 to RA. She felt well. She had echo which was reviewed with cardiology. She was determined to be medically stable for discharge home with holter monitor and instructions for follow up.     D/c >30min    Pertinent Physical Exam At Time of Discharge  Physical Exam  Constitutional: Well developed, no distress, alert and cooperative  Skin: Warm and dry  Eyes: EOMI, clear sclera  ENMT: mucous membranes moist  Respiratory: CTAB, wet cough, on RA   Cardiovascular: RRR  Abdominal: Nondistended, soft, non-tender, +BS  MSK: ROM intact  Neuro: alert and oriented x3    Outpatient Follow-Up  Future Appointments   Date Time Provider Department Center   3/7/2025  2:45 PM Marie Salas MD [id number] [facility]   3/10/2025  2:40 PM Pietro Post MD [id number] [facility]   12/1/2025  3:00 PM Marcelino Hammer DO [id number] [facility]         Felicia Cheek DO

## 2025-02-11 NOTE — CARE PLAN
Pt did not maintain NSR throughout the shift. Upon arrival to shift, pt ambulating and up to restroom, HR in 1teens. Shortly after, HR leveled out and came down to 60s-70s. SB at times throughout the night/during sleep, HR 50s. VS stable. Pt RA upon arrival to shift, on 2L NC during sleep, tolerated well, no stated SOB. No complaints of pain during the night. PRN melatonin given x1 for sleep. Up to restroom multiple times throughout the shift, tolerated activity well. Compliant with medications. Safety maintained.    Problem: Pain - Adult  Goal: Verbalizes/displays adequate comfort level or baseline comfort level  2/11/2025 0633 by Charu Jordan RN  Outcome: Progressing     Problem: Safety - Adult  Goal: Free from fall injury  2/11/2025 0633 by Charu Jordan RN  Outcome: Met     Problem: Discharge Planning  Goal: Discharge to home or other facility with appropriate resources  2/11/2025 0633 by Charu Jordan RN  Outcome: Progressing     Problem: Chronic Conditions and Co-morbidities  Goal: Patient's chronic conditions and co-morbidity symptoms are monitored and maintained or improved  2/11/2025 0633 by Charu Jordan RN  Outcome: Progressing     Problem: Nutrition  Goal: Nutrient intake appropriate for maintaining nutritional needs  Outcome: Progressing     Problem: Arrythmia/Dysrhythmia  Goal: Lab values return to normal range  2/11/2025 0633 by Charu Jordan RN  Outcome: Progressing     Problem: Arrythmia/Dysrhythmia  Goal: No evidence of post procedure complications  2/11/2025 0633 by Charu Jordan RN  Outcome: Progressing     Problem: Arrythmia/Dysrhythmia  Goal: Promote self management  2/11/2025 0633 by Charu Jordan RN  Outcome: Progressing     Problem: Arrythmia/Dysrhythmia  Goal: Serial ECG will return to baseline  2/11/2025 0633 by Charu Jordan RN  Outcome: Progressing     Problem: Arrythmia/Dysrhythmia  Goal: Vital signs return to baseline  2/11/2025 0633 by Charu Jordan  RN  Outcome: Progressing    The clinical goals for the shift include Pt will maintain NSR throughout the shift.

## 2025-02-11 NOTE — NURSING NOTE
Patient discharged home via wheelchair and private car. Holter monitor on and meds to beds delivered. Patient verbalizes understanding of discharge instructions and follow up appt.

## 2025-02-12 ENCOUNTER — PATIENT OUTREACH (OUTPATIENT)
Dept: PRIMARY CARE | Facility: CLINIC | Age: 80
End: 2025-02-12
Payer: MEDICARE

## 2025-02-12 LAB
ATRIAL RATE: 76 BPM
P AXIS: 69 DEGREES
P OFFSET: 214 MS
P ONSET: 156 MS
PR INTERVAL: 142 MS
Q ONSET: 227 MS
QRS COUNT: 13 BEATS
QRS DURATION: 82 MS
QT INTERVAL: 370 MS
QTC CALCULATION(BAZETT): 416 MS
QTC FREDERICIA: 400 MS
R AXIS: 46 DEGREES
T AXIS: 49 DEGREES
T OFFSET: 412 MS
VENTRICULAR RATE: 76 BPM

## 2025-02-12 NOTE — PROGRESS NOTES
Discharge Facility: [facility]  Discharge Diagnosis: Acute hypoxic respiratory failure  Admission Date: 2/10/2025  Discharge Date: 2/11/2025    PCP Appointment Date:  -2/18/2025 1140    Specialist Appointment Date:   -3/7/2025 1445 ortho  -3/10/2025 1440 cardiology    Hospital Encounter and Summary Linked: Yes    ED to Hosp-Admission (Discharged) with Felicia Cheek DO; Eliezer Feng DO (02/10/2025)     See discharge assessment below for further details    Wrap Up  Wrap Up Additional Comments: Pt was admitted to [facility] 2/10-2/11/2025 for acute hypoxic respiratory failure. Pt reports feeling better since discharge. Pt discharged with prescriptions for albuterol, eliquis, robitussin dm, prednisone, verapamil sr, and verapamil; pt denies questions or issues with medication. Pt reports understanding of discharge instructions. Verified pt upcoming PCP appt 2/18/2025 1140. Verified pt cardiology follow up 3/10/2025 1440. Pt denies any questions, needs, or concerns. She is encouraged to call if questions or needs arise. (2/12/2025  9:59 AM)  Call End Time: 1000 (2/12/2025  9:59 AM)    Engagement  Call Start Time: 0958 (2/12/2025  9:59 AM)    Medications  Medications reviewed with patient/caregiver?: Yes (2/12/2025  9:59 AM)  Is the patient having any side effects they believe may be caused by any medication additions or changes?: No (2/12/2025  9:59 AM)  Does the patient have all medications ordered at discharge?: Yes (2/12/2025  9:59 AM)  Care Management Interventions: No intervention needed (2/12/2025  9:59 AM)  Is the patient taking all medications as directed (includes completed medication regime)?: Yes (2/12/2025  9:59 AM)    Appointments  Does the patient have a primary care provider?: Yes (2/12/2025  9:59 AM)  Care Management Interventions: Verified appointment date/time/provider (2/12/2025  9:59 AM)  Has the patient kept scheduled appointments due by today?: Yes (2/12/2025  9:59 AM)    Self  Management  Has home health visited the patient within 72 hours of discharge?: Not applicable (2/12/2025  9:59 AM)    Patient Teaching  Does the patient have access to their discharge instructions?: Yes (2/12/2025  9:59 AM)  Care Management Interventions: Reviewed instructions with patient (2/12/2025  9:59 AM)  What is the patient's perception of their health status since discharge?: Improving (2/12/2025  9:59 AM)  Is the patient/caregiver able to teach back the hierarchy of who to call/visit for symptoms/problems? PCP, Specialist, Home Health nurse, Urgent Care, ED, 911: Yes (2/12/2025  9:59 AM)

## 2025-02-18 ENCOUNTER — APPOINTMENT (OUTPATIENT)
Dept: PRIMARY CARE | Facility: CLINIC | Age: 80
End: 2025-02-18
Payer: MEDICARE

## 2025-02-18 VITALS
HEIGHT: 62 IN | DIASTOLIC BLOOD PRESSURE: 72 MMHG | OXYGEN SATURATION: 97 % | TEMPERATURE: 96.9 F | SYSTOLIC BLOOD PRESSURE: 122 MMHG | BODY MASS INDEX: 24.48 KG/M2 | RESPIRATION RATE: 17 BRPM | WEIGHT: 133 LBS | HEART RATE: 66 BPM

## 2025-02-18 DIAGNOSIS — R06.02 SHORTNESS OF BREATH: ICD-10-CM

## 2025-02-18 DIAGNOSIS — J96.01 ACUTE HYPOXIC RESPIRATORY FAILURE (MULTI): ICD-10-CM

## 2025-02-18 DIAGNOSIS — I48.91 ATRIAL FIBRILLATION, UNSPECIFIED TYPE (MULTI): Primary | ICD-10-CM

## 2025-02-18 DIAGNOSIS — R79.89 ELEVATED BRAIN NATRIURETIC PEPTIDE (BNP) LEVEL: ICD-10-CM

## 2025-02-18 DIAGNOSIS — J06.9 URI, ACUTE: ICD-10-CM

## 2025-02-18 DIAGNOSIS — Z00.00 HEALTH CARE MAINTENANCE: ICD-10-CM

## 2025-02-18 PROCEDURE — 1111F DSCHRG MED/CURRENT MED MERGE: CPT | Performed by: FAMILY MEDICINE

## 2025-02-18 PROCEDURE — 1157F ADVNC CARE PLAN IN RCRD: CPT | Performed by: FAMILY MEDICINE

## 2025-02-18 PROCEDURE — 1159F MED LIST DOCD IN RCRD: CPT | Performed by: FAMILY MEDICINE

## 2025-02-18 PROCEDURE — 3074F SYST BP LT 130 MM HG: CPT | Performed by: FAMILY MEDICINE

## 2025-02-18 PROCEDURE — 3078F DIAST BP <80 MM HG: CPT | Performed by: FAMILY MEDICINE

## 2025-02-18 PROCEDURE — 99495 TRANSJ CARE MGMT MOD F2F 14D: CPT | Performed by: FAMILY MEDICINE

## 2025-02-18 PROCEDURE — 1123F ACP DISCUSS/DSCN MKR DOCD: CPT | Performed by: FAMILY MEDICINE

## 2025-02-18 PROCEDURE — 1036F TOBACCO NON-USER: CPT | Performed by: FAMILY MEDICINE

## 2025-02-18 RX ORDER — AZITHROMYCIN 250 MG/1
TABLET, FILM COATED ORAL
Qty: 6 TABLET | Refills: 0 | Status: SHIPPED | OUTPATIENT
Start: 2025-02-18 | End: 2025-02-23

## 2025-02-18 ASSESSMENT — ENCOUNTER SYMPTOMS
MUSCULOSKELETAL NEGATIVE: 1
ENDOCRINE NEGATIVE: 1
ALLERGIC/IMMUNOLOGIC NEGATIVE: 1
COUGH: 1
SINUS PRESSURE: 0
PALPITATIONS: 0
SINUS PAIN: 0
HEMATOLOGIC/LYMPHATIC NEGATIVE: 1
CHEST TIGHTNESS: 0
FATIGUE: 1
EYES NEGATIVE: 1
NEUROLOGICAL NEGATIVE: 1
MYALGIAS: 0
SHORTNESS OF BREATH: 1
SORE THROAT: 0
WHEEZING: 1

## 2025-02-18 NOTE — PROGRESS NOTES
"Subjective   Patient ID: Manasa Goel is a 79 y.o. female who presents for Follow-up (1 week follow up from [facility] for SOB. Pt states she was coughing and congested and with the cough she was having anxiety and she feels the SOB was due to anxiety. She did have testing done and all test were normal. She was told she may have possible afib and is currently wearing a heart monitor. She states she has only noticed SOB with cough. ).    HPI     Review of Systems   Constitutional:  Positive for fatigue.   HENT:  Negative for congestion, sinus pressure, sinus pain and sore throat.    Eyes: Negative.    Respiratory:  Positive for cough, shortness of breath and wheezing. Negative for chest tightness.    Cardiovascular:  Negative for chest pain, palpitations and leg swelling.   Endocrine: Negative.    Genitourinary: Negative.    Musculoskeletal: Negative.  Negative for myalgias.   Allergic/Immunologic: Negative.    Neurological: Negative.    Hematological: Negative.        Objective   /72   Pulse 66   Temp 36.1 °C (96.9 °F)   Resp 17   Ht 1.575 m (5' 2\")   Wt 60.3 kg (133 lb)   SpO2 97%   BMI 24.33 kg/m²     Physical Exam  Constitutional:       Appearance: Normal appearance.   Pulmonary:      Effort: Pulmonary effort is normal.      Breath sounds: Rhonchi present.   Musculoskeletal:      Cervical back: No edema, erythema or tenderness. Normal range of motion.      Thoracic back: No spasms, tenderness or bony tenderness. Normal range of motion.      Lumbar back: No spasms, tenderness or bony tenderness. Normal range of motion. Negative right straight leg raise test and negative left straight leg raise test.      Comments:      Skin:     General: Skin is warm and dry.   Neurological:      Cranial Nerves: Cranial nerves 2-12 are intact.      Sensory: Sensation is intact.      Motor: No weakness, tremor, atrophy or abnormal muscle tone.      Gait: Gait is intact. Gait normal.      Deep Tendon Reflexes:      Reflex " Scores:       Brachioradialis reflexes are 2+ on the right side and 2+ on the left side.       Patellar reflexes are 2+ on the right side and 2+ on the left side.       Achilles reflexes are 2+ on the right side and 2+ on the left side.        Assessment/Plan   Problem List Items Addressed This Visit             ICD-10-CM    URI, acute J06.9    Relevant Medications    azithromycin (Zithromax) 250 mg tablet    dextromethorphan-guaifenesin (Mucinex DM)  mg 12 hr tablet    Other Relevant Orders    B-type natriuretic peptide     Other Visit Diagnoses         Codes    Atrial fibrillation, unspecified type (Multi)    -  Primary I48.91    Relevant Orders    Referral to Clinical Pharmacy    B-type natriuretic peptide    Acute hypoxic respiratory failure (Multi)     J96.01    Relevant Orders    B-type natriuretic peptide    Health care maintenance     Z00.00    Relevant Orders    B-type natriuretic peptide    Comprehensive Metabolic Panel    Elevated brain natriuretic peptide (BNP) level     R79.89    Relevant Orders    B-type natriuretic peptide    Shortness of breath     R06.02    Relevant Orders    B-type natriuretic peptide

## 2025-02-19 LAB
ALBUMIN SERPL-MCNC: 4.6 G/DL (ref 3.6–5.1)
ALP SERPL-CCNC: 106 U/L (ref 37–153)
ALT SERPL-CCNC: 20 U/L (ref 6–29)
ANION GAP SERPL CALCULATED.4IONS-SCNC: 7 MMOL/L (CALC) (ref 7–17)
AST SERPL-CCNC: 17 U/L (ref 10–35)
BILIRUB SERPL-MCNC: 0.4 MG/DL (ref 0.2–1.2)
BNP SERPL-MCNC: 25 PG/ML
BUN SERPL-MCNC: 19 MG/DL (ref 7–25)
CALCIUM SERPL-MCNC: 10.3 MG/DL (ref 8.6–10.4)
CHLORIDE SERPL-SCNC: 104 MMOL/L (ref 98–110)
CO2 SERPL-SCNC: 29 MMOL/L (ref 20–32)
CREAT SERPL-MCNC: 0.67 MG/DL (ref 0.6–0.95)
EGFRCR SERPLBLD CKD-EPI 2021: 88 ML/MIN/1.73M2
GLUCOSE SERPL-MCNC: 79 MG/DL (ref 65–139)
POTASSIUM SERPL-SCNC: 4.4 MMOL/L (ref 3.5–5.3)
PROT SERPL-MCNC: 6.8 G/DL (ref 6.1–8.1)
Q ONSET: 225 MS
Q ONSET: 226 MS
QRS COUNT: 23 BEATS
QRS COUNT: 28 BEATS
QRS DURATION: 162 MS
QRS DURATION: 72 MS
QT INTERVAL: 270 MS
QT INTERVAL: 298 MS
QTC CALCULATION(BAZETT): 458 MS
QTC CALCULATION(BAZETT): 462 MS
QTC FREDERICIA: 384 MS
QTC FREDERICIA: 399 MS
R AXIS: 41 DEGREES
R AXIS: 47 DEGREES
SODIUM SERPL-SCNC: 140 MMOL/L (ref 135–146)
T AXIS: -32 DEGREES
T AXIS: -5 DEGREES
T OFFSET: 361 MS
T OFFSET: 374 MS
VENTRICULAR RATE: 145 BPM
VENTRICULAR RATE: 173 BPM

## 2025-02-20 ENCOUNTER — PATIENT OUTREACH (OUTPATIENT)
Dept: PRIMARY CARE | Facility: CLINIC | Age: 80
End: 2025-02-20
Payer: MEDICARE

## 2025-02-20 NOTE — PROGRESS NOTES
Call regarding appt. with PCP on 2/18/2025 after hospitalization.  At time of outreach call the patient feels as if their condition has improved since last visit. Pt reports Dr. Hammer prescribed her medication and she has a lot of energy and is feeling a hundred percent better. Pt prescribed azithromycin and guaifenesin/dextromethorphan; she denies questions or issues regarding medication.  Reviewed the PCP appointment with the pt and addressed any questions or concerns.    Verified upcoming appts;  -next PCP appt 2/24/2025 1140  -3/7/2025 ortho  -3/10/2025 cardiology    Pt denies any questions, needs, or concerns. She is encouraged to call if questions or needs arise.

## 2025-02-21 ENCOUNTER — APPOINTMENT (OUTPATIENT)
Dept: ORTHOPEDIC SURGERY | Facility: CLINIC | Age: 80
End: 2025-02-21
Payer: MEDICARE

## 2025-02-24 ENCOUNTER — APPOINTMENT (OUTPATIENT)
Dept: PRIMARY CARE | Facility: CLINIC | Age: 80
End: 2025-02-24
Payer: MEDICARE

## 2025-02-24 VITALS
SYSTOLIC BLOOD PRESSURE: 104 MMHG | TEMPERATURE: 72 F | HEIGHT: 62 IN | BODY MASS INDEX: 24.11 KG/M2 | HEART RATE: 78 BPM | DIASTOLIC BLOOD PRESSURE: 68 MMHG | OXYGEN SATURATION: 98 % | WEIGHT: 131 LBS

## 2025-02-24 DIAGNOSIS — J06.9 URI, ACUTE: Primary | ICD-10-CM

## 2025-02-24 DIAGNOSIS — G43.909 MIGRAINE WITHOUT STATUS MIGRAINOSUS, NOT INTRACTABLE, UNSPECIFIED MIGRAINE TYPE: ICD-10-CM

## 2025-02-24 DIAGNOSIS — F51.01 PRIMARY INSOMNIA: ICD-10-CM

## 2025-02-24 DIAGNOSIS — I10 PRIMARY HYPERTENSION: ICD-10-CM

## 2025-02-24 PROCEDURE — 99213 OFFICE O/P EST LOW 20 MIN: CPT | Performed by: FAMILY MEDICINE

## 2025-02-24 ASSESSMENT — ENCOUNTER SYMPTOMS
APPETITE CHANGE: 0
DIZZINESS: 0
CHEST TIGHTNESS: 0
ARTHRALGIAS: 0
FREQUENCY: 0
WEAKNESS: 0
SHORTNESS OF BREATH: 0
POLYPHAGIA: 0
POLYDIPSIA: 0
WHEEZING: 0
NUMBNESS: 0
MYALGIAS: 0
FATIGUE: 0
NAUSEA: 0
HEADACHES: 0
COUGH: 1
DIARRHEA: 0
FEVER: 0
VOMITING: 0

## 2025-02-24 NOTE — PROGRESS NOTES
"Subjective   Patient ID: Manasa Goel is a 79 y.o. female who presents for Follow-up (1 week follow up for Afib and chest congestion. Pt states she is doing well and symptoms have subsided. ).    HPI     Review of Systems   Constitutional:  Negative for appetite change, fatigue and fever.   HENT:  Negative for congestion.    Eyes:  Negative for visual disturbance.   Respiratory:  Positive for cough. Negative for chest tightness, shortness of breath and wheezing.    Cardiovascular:  Negative for chest pain and leg swelling.   Gastrointestinal:  Negative for diarrhea, nausea and vomiting.   Endocrine: Negative for polydipsia, polyphagia and polyuria.   Genitourinary:  Negative for frequency and urgency.   Musculoskeletal:  Negative for arthralgias and myalgias.   Neurological:  Negative for dizziness, syncope, weakness, numbness and headaches.       Objective   /68   Pulse 78   Temp (!) 22.2 °C (72 °F)   Ht 1.575 m (5' 2\")   Wt 59.4 kg (131 lb)   SpO2 98%   BMI 23.96 kg/m²     Physical Exam  Constitutional:       Appearance: Normal appearance.   HENT:      Head: Normocephalic.   Eyes:      Conjunctiva/sclera: Conjunctivae normal.   Cardiovascular:      Rate and Rhythm: Normal rate and regular rhythm.      Heart sounds: Normal heart sounds.   Pulmonary:      Effort: Pulmonary effort is normal.      Breath sounds: Normal breath sounds. No wheezing or rhonchi.   Musculoskeletal:      Cervical back: Neck supple.   Skin:     General: Skin is warm and dry.   Neurological:      Mental Status: She is alert.         Assessment/Plan   Problem List Items Addressed This Visit             ICD-10-CM    Hypertension I10    Insomnia G47.00    Migraines G43.909    URI, acute - Primary J06.9     Awaiting cardio results     "

## 2025-02-27 ENCOUNTER — APPOINTMENT (OUTPATIENT)
Dept: PHARMACY | Facility: HOSPITAL | Age: 80
End: 2025-02-27
Payer: MEDICARE

## 2025-02-27 DIAGNOSIS — I48.91 ATRIAL FIBRILLATION, UNSPECIFIED TYPE (MULTI): ICD-10-CM

## 2025-02-27 NOTE — PROGRESS NOTES
Subjective   Patient ID: Manasa Goel is a 79 y.o. female who presents for No chief complaint on file..    Referring Provider: Marcelino Hammer DO     HPI    Review of Systems    Objective     There were no vitals taken for this visit.     Labs  Lab Results   Component Value Date    BILITOT 0.4 02/18/2025    CALCIUM 10.3 02/18/2025    CO2 29 02/18/2025     02/18/2025    CREATININE 0.67 02/18/2025    GLUCOSE 79 02/18/2025    ALKPHOS 106 02/18/2025    K 4.4 02/18/2025    PROT 6.8 02/18/2025     02/18/2025    AST 17 02/18/2025    ALT 20 02/18/2025    BUN 19 02/18/2025    ANIONGAP 7 02/18/2025    MG 2.25 02/11/2025    PHOS 3.4 02/11/2025    ALBUMIN 4.6 02/18/2025     Lab Results   Component Value Date    TRIG 124 12/06/2024    CHOL 226 (H) 12/06/2024    LDLCALC 139 (H) 12/06/2024    HDL 62.0 12/06/2024     Lab Results   Component Value Date    HGBA1C 5.2 12/06/2024       Assessment/Plan   Patient was referred for cost assistance for Eliquis. Discussed  PAP and patient seems to qualify, however, she's not sure if she is going to be continued on medication after follow up with cardiology. Patient to discuss with cardiology and will follow up in 2 weeks to see if  PAP needs to be pursued.     Follow up in 1 week    Continue all meds under the continuation of care with the referring provider and clinical pharmacy team.    Micaela Gomez, PharmD

## 2025-03-07 ENCOUNTER — APPOINTMENT (OUTPATIENT)
Dept: ORTHOPEDIC SURGERY | Facility: CLINIC | Age: 80
End: 2025-03-07
Payer: MEDICARE

## 2025-03-07 DIAGNOSIS — M79.641 RIGHT HAND PAIN: Primary | ICD-10-CM

## 2025-03-07 NOTE — PROGRESS NOTES
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  Injection 3/12/24 and 10/11/2024 with relief.  Continues to do well.    Physical Examination:  Right upper extremity:  The patient appears to be their stated age, is in no apparent distress, and is oriented x3. The patients mood and affect are appropriate. The patients gait is normal. The examination of the limb in question was performed in comparison to the contralateral limb.    On musculoskeletal examination  minimal pain with wrist range of motion.  Minimal tenderness over the right thumb CMC.      Sensation and motor function are intact in the radial, and median nerve distribution. There is no obvious thenar atrophy, and thenar strength is 5/5. There is no intrinsic atrophy, and intrinsic strength is 5/5.  The patient can make a full composite fist. The hand itself is warm and well perfused. The skin is intact throughout. The contralateral hand/wrist are normal to inspection, range of motion, stability, and strength.    Procedures      Assessment:  Improved right wrist and thumb CMC pain.  She is doing well and is relatively asymptomatic.  Is working on hand exercises.    Plan:   Continue bracing and anti-inflammatories as needed.  If she has recurrence of pain she will represent for repeat injection.    Marie Harp MD

## 2025-03-10 ENCOUNTER — OFFICE VISIT (OUTPATIENT)
Dept: CARDIOLOGY | Facility: CLINIC | Age: 80
End: 2025-03-10
Payer: MEDICARE

## 2025-03-10 VITALS
HEART RATE: 62 BPM | HEIGHT: 62 IN | BODY MASS INDEX: 24.11 KG/M2 | DIASTOLIC BLOOD PRESSURE: 60 MMHG | OXYGEN SATURATION: 96 % | SYSTOLIC BLOOD PRESSURE: 108 MMHG | WEIGHT: 131 LBS

## 2025-03-10 DIAGNOSIS — I47.19 ATRIAL TACHYCARDIA (CMS-HCC): Primary | ICD-10-CM

## 2025-03-10 PROCEDURE — 1036F TOBACCO NON-USER: CPT | Performed by: INTERNAL MEDICINE

## 2025-03-10 PROCEDURE — 1126F AMNT PAIN NOTED NONE PRSNT: CPT | Performed by: INTERNAL MEDICINE

## 2025-03-10 PROCEDURE — 3078F DIAST BP <80 MM HG: CPT | Performed by: INTERNAL MEDICINE

## 2025-03-10 PROCEDURE — 99214 OFFICE O/P EST MOD 30 MIN: CPT | Mod: 25 | Performed by: INTERNAL MEDICINE

## 2025-03-10 PROCEDURE — 93010 ELECTROCARDIOGRAM REPORT: CPT | Performed by: INTERNAL MEDICINE

## 2025-03-10 PROCEDURE — 1157F ADVNC CARE PLAN IN RCRD: CPT | Performed by: INTERNAL MEDICINE

## 2025-03-10 PROCEDURE — 93005 ELECTROCARDIOGRAM TRACING: CPT | Performed by: INTERNAL MEDICINE

## 2025-03-10 PROCEDURE — 99214 OFFICE O/P EST MOD 30 MIN: CPT | Performed by: INTERNAL MEDICINE

## 2025-03-10 PROCEDURE — 1159F MED LIST DOCD IN RCRD: CPT | Performed by: INTERNAL MEDICINE

## 2025-03-10 PROCEDURE — 3074F SYST BP LT 130 MM HG: CPT | Performed by: INTERNAL MEDICINE

## 2025-03-10 PROCEDURE — 1111F DSCHRG MED/CURRENT MED MERGE: CPT | Performed by: INTERNAL MEDICINE

## 2025-03-10 PROCEDURE — 1123F ACP DISCUSS/DSCN MKR DOCD: CPT | Performed by: INTERNAL MEDICINE

## 2025-03-10 RX ORDER — ASPIRIN 81 MG/1
81 TABLET ORAL DAILY
Start: 2025-03-10 | End: 2026-03-10

## 2025-03-10 RX ORDER — VERAPAMIL HCL 240 MG
240 TABLET, EXTENDED RELEASE ORAL DAILY
COMMUNITY
End: 2025-03-13 | Stop reason: SDUPTHER

## 2025-03-10 ASSESSMENT — PAIN SCALES - GENERAL: PAINLEVEL_OUTOF10: 0-NO PAIN

## 2025-03-10 NOTE — PROGRESS NOTES
Name : Manasa Goel   : 1945   MRN : [id number]   ENC Date : 03/10/2025      Assessment and Plan:  Paroxysmal atrial tachycardia/paroxysmal atrial fibrillation: Holter monitor was reviewed.  No episodes of atrial fibrillation seen.  Patient would like to stop her Eliquis.  I actually think this is reasonable.  Her arrhythmia burden is probably fairly low.  I would continue verapamil.  I encouraged her to purchase the [employer] mobile device or a smart watch to track her heart rhythm.  If she has a recurrence of more clear-cut atrial fibrillation then would need to resume oral anticoagulation.  Disp: RTO in 6 months    HPI:  Patient is seen posthospitalization.  She has done quite well.  Her upper respiratory tract infection symptoms have resolved.  She reports no palpitations.  Her Holter monitor did show about a 3% incidence of episodes of supraventricular tachycardia however the longest episode was only 18 beats in duration.  Patient wants to stop her Eliquis due to cost and concern that she has not had significant enough atrial fibrillation to need it.  She denies any other symptoms.    Problem list overview:   Patient Active Problem List   Diagnosis    Abnormal sensation of upper extremity    Acute inflammation of nasal sinus    Bilateral impacted cerumen    Carpal tunnel syndrome of right wrist    Hand pain    Hearing loss of both ears due to cerumen impaction    Hypertension    Hypothyroidism    Insomnia    Lentigo    Migraines    Muscle tightness    Musculoskeletal pain of right thigh    Nasal polyps    Rhinitis    Headache    Neck pain    Osteoarthritis    Pes anserinus tendinitis of left lower extremity    Situational anxiety    Sore throat    Tinnitus of both ears    URI, acute       Meds:   Current Outpatient Medications on File Prior to Visit   Medication Sig Dispense Refill    levothyroxine (Synthroid, Levoxyl) 125 mcg tablet Take 1 tablet (125 mcg) by mouth once daily. 90 tablet 3    lisinopril 20 mg  "tablet Take 1 tablet (20 mg) by mouth once daily. 90 tablet 3    mv-min-FA-vit K-lutein-zeaxant (PreserVision AREDS 2 Plus MV) 200 mcg-15 mcg- 5 mg-1 mg capsule Take 1 capsule by mouth once daily at noon. Take before meals..      verapamil SR (Calan-SR) 240 mg ER tablet Take 1 tablet (240 mg) by mouth once daily. Do not crush or chew.      [DISCONTINUED] apixaban (Eliquis) 5 mg tablet Take 1 tablet (5 mg) by mouth 2 times a day. 60 tablet 0    albuterol (Ventolin HFA) 90 mcg/actuation inhaler Inhale 2 puffs every 4 hours if needed for wheezing or shortness of breath. (Patient not taking: Reported on 3/10/2025) 8.5 g 0     No current facility-administered medications on file prior to visit.        VS:  /60 (BP Location: Left arm, Patient Position: Sitting, BP Cuff Size: Adult)   Pulse 62   Ht 1.575 m (5' 2\")   Wt 59.4 kg (131 lb)   SpO2 96%   BMI 23.96 kg/m²     Vitals reviewed.   Neck:      Vascular: No JVD.   Pulmonary:      Effort: Pulmonary effort is normal.      Breath sounds: Normal breath sounds.   Cardiovascular:      Normal rate. Regular rhythm.      Murmurs: There is no murmur.      No gallop.    Pulses:     Intact distal pulses.   Edema:     Peripheral edema absent.   Abdominal:      General: Abdomen is flat.      Palpations: Abdomen is soft.   Neurological:      General: No focal deficit present.      Mental Status: Alert.   Psychiatric:         Mood and Affect: Mood normal.         ECG: No results found for this or any previous visit.   ECHO: Results for orders placed during the hospital encounter of 02/10/25    Transthoracic Echo (TTE) Complete    Narrative  [facility] [place]  [address] [address] [address]  Tel [phone] Fax [phone]    TRANSTHORACIC ECHOCARDIOGRAM REPORT    Patient Name:       KRISTIN David Physician:    [id number] Pietro Post MD  Study Date:         2/10/2025            Ordering Provider:    [id number] FELICIANO  MRN/PID:            " [id number]             Fellow:  Accession#:         [id number]         Nurse:  Date of Birth/Age:  1945 / 79 years  Sonographer:          Ilene Akins RDCS  Gender Assigned at  F                    Additional Staff:  Birth:  Height:             157.48 cm            Admit Date:  Weight:             58.97 kg             Admission Status:     Inpatient -  Priority  discharge  BSA / BMI:          1.59 m2 / 23.78      Department Location:  [facility]  kg/m2                                      Dept  Blood Pressure: 162 /72 mmHg    Study Type:    TRANSTHORACIC ECHO (TTE) COMPLETE  Diagnosis/ICD: Paroxysmal atrial fibrillation-I48.0  Indication:    Afib  CPT Codes:     Echo Complete w Full Doppler-[id number]  Study Detail: The following Echo studies were performed: 2D, M-Mode, Doppler and  color flow.      PHYSICIAN INTERPRETATION:  Left Ventricle: The left ventricular systolic function is normal, with a visually estimated ejection fraction of 60-65%. There is borderline concentric left ventricular hypertrophy. There are no regional wall motion abnormalities. The left ventricular cavity size is normal. There is mild increased septal and normal posterior left ventricular wall thickness. Spectral Doppler shows a Grade I (impaired relaxation pattern) of left ventricular diastolic filling with normal left atrial filling pressure.  Left Atrium: The left atrial size is mildly dilated.  Right Ventricle: The right ventricle is normal in size. There is normal right ventricular global systolic function.  Right Atrium: The right atrial size is normal.  Aortic Valve: The aortic valve is trileaflet. There is evidence of mildly elevated transaortic gradients consistent with sclerosis of the aortic valve.  There is trace aortic valve regurgitation. The peak instantaneous gradient of the aortic valve is 7 mmHg.  Mitral Valve: The mitral valve is normal in structure. There is mild mitral valve regurgitation.  Tricuspid Valve: The  tricuspid valve is structurally normal. There is mild tricuspid regurgitation. The Doppler estimated RVSP is within normal limits at 32.4 mmHg.  Pulmonic Valve: The pulmonic valve is structurally normal. There is no indication of pulmonic valve regurgitation.  Pericardium: No pericardial effusion noted.  Aorta: The aortic root is normal.  Systemic Veins: The inferior vena cava appears mildly dilated.  In comparison to the previous echocardiogram(s): There are no prior studies on this patient for comparison purposes.      CONCLUSIONS:  1. The left ventricular systolic function is normal, with a visually estimated ejection fraction of 60-65%.  2. Spectral Doppler shows a Grade I (impaired relaxation pattern) of left ventricular diastolic filling with normal left atrial filling pressure.  3. Right ventricular systolic pressure is within normal limits.  4. Aortic valve sclerosis.  5. The inferior vena cava appears mildly dilated.    QUANTITATIVE DATA SUMMARY:    2D MEASUREMENTS:             Normal Ranges:  LAs:             3.74 cm     (2.7-4.0cm)  IVSd:            1.12 cm     (0.6-1.1cm)  LVPWd:           0.87 cm     (0.6-1.1cm)  LVIDd:           4.47 cm     (3.9-5.9cm)  LVIDs:           3.02 cm  LV Mass Index:   94.5 g/m2  LVEDV Index:     33.88 ml/m2  LV % FS          32.3 %      LEFT ATRIUM:                 Normal Ranges:  LA Area A4C:      15.1 cm2  LA Area A2C:      15.9 cm2  LA Volume Index:  25.0 ml/m2  LA Vol A4C:       36.0 ml  LA Vol A2C:       38.6 ml  LA Vol Index BSA: 23.4 ml/m2      M-MODE MEASUREMENTS:         Normal Ranges:  Ao Root:             2.60 cm (2.0-3.7cm)  AoV Exc:             1.73 cm (1.5-2.5cm)      AORTA MEASUREMENTS:         Normal Ranges:  AoV Exc:            1.73 cm (1.5-2.5cm)  Ao Sinus, d:        3.60 cm (2.1-3.5cm)  Ao STJ, d:          2.90 cm (1.7-3.4cm)  Asc Ao, d:          3.00 cm (2.1-3.4cm)      LV SYSTOLIC FUNCTION:  Normal Ranges:  EF-A4C View:    61 % (>=55%)  EF-A2C View:     62 %  EF-Biplane:     59 %  EF-Visual:      63 %  LV EF Reported: 63 %      LV DIASTOLIC FUNCTION:             Normal Ranges:  MV Peak E:             0.49 m/s    (0.7-1.2 m/s)  MV Peak A:             0.61 m/s    (0.42-0.7 m/s)  E/A Ratio:             0.80        (1.0-2.2)  MV e'                  0.100 m/s   (>8.0)  MV lateral e'          0.11 m/s  MV medial e'           0.09 m/s  E/e' Ratio:            4.92        (<8.0)  PulmV Sys Bill:         46.22 cm/s  PulmV Kilgore Bill:        32.66 cm/s  PulmV S/D Bill:         1.42  PulmV A Revs Bill:      27.76 cm/s  PulmV A Revs Dur:      105.61 msec      MITRAL VALVE:          Normal Ranges:  MV DT:        218 msec (150-240msec)      AORTIC VALVE:           Normal Ranges:  AoV Vmax:      1.36 m/s (<=1.7m/s)  AoV Peak P.4 mmHg (<20mmHg)  LVOT Max Bill:  1.15 m/s (<=1.1m/s)  LVOT VTI:      22.08 cm  LVOT Diameter: 2.15 cm  (1.8-2.4cm)  AoV Area,Vmax: 3.07 cm2 (2.5-4.5cm2)      AORTIC INSUFFICIENCY:  AI Vmax:       3.24 m/s  AI Half-time:  491 msec  AI Decel Time: 1692 msec  AI Decel Rate: 205.88 cm/s2      RIGHT VENTRICLE:  RV Basal 2.70 cm  RV Mid   1.60 cm  RV Major 6.0 cm  TAPSE:   18.0 mm  RV s'    0.20 m/s      TRICUSPID VALVE/RVSP:          Normal Ranges:  Peak TR Velocity:     2.47 m/s  RV Syst Pressure:     32 mmHg  (< 30mmHg)  IVC Diam:             2.18 cm      PULMONIC VALVE:          Normal Ranges:  PV Accel Time:  120 msec (>120ms)      PULMONARY VEINS:  PulmV A Revs Dur: 105.61 msec  PulmV A Revs Bill: 27.76 cm/s  PulmV Kilg[place] Bill:   32.66 cm/s  PulmV S/D Bill:    1.42  PulmV Sys Bill:    46.22 cm/s      [id number] Pietro Post MD  Electronically signed on 2/10/2025 at 4:11:27 PM        ** Final **     CT Results:  CT angio chest for pulmonary embolism 02/10/2025    Narrative  Interpreted By:  Davon Dunn,  STUDY:  CT ANGIO CHEST FOR PULMONARY EMBOLISM;  2/10/2025 6:50 am    INDICATION:  Signs/Symptoms:shortness of breath, wheezing bilaterally, rule out PE  versus  pneumonia.      COMPARISON:  None.    ACCESSION NUMBER(S):  [id number]    ORDERING CLINICIAN:  HIRAM DE LEON    TECHNIQUE:  Helical data acquisition of the chest was obtained following the  intravenous administration of  60 ml of contrast/Omnipaque 350.  Images were reformatted in axial, coronal, and sagittal planes. 3D  post processing was performed on an independent workstation and  volume rendered images of the pulmonary arteries were created and  utilized in the interpretation.    FINDINGS:  POTENTIAL LIMITATIONS OF THE STUDY:   Respiratory motion artifact.    HEART AND VESSELS:  No filling defects are identified within the pulmonary arteries to  indicate the presence of a pulmonary embolism.    There are atherosclerotic calcifications of the aorta and its  branches. Aorta is normal in course and caliber. There is prominence  of the central pulmonary arteries suggesting pulmonary hypertension.    Heart is enlarged.    No pericardial effusion is seen.    MEDIASTINUM AND JANICE, LOWER NECK AND AXILLA:  The visualized thyroid gland is within normal limits.    No evidence of thoracic lymphadenopathy by CT criteria.    Esophagus appears within normal limits as seen. Small hiatal hernia.    LUNGS AND AIRWAYS:  The trachea and central airways are patent. No endobronchial lesion.    Mild areas of atelectasis/scarring in the lungs. No focal  consolidation. No effusion or pneumothorax.    UPPER ABDOMEN:  The visualized subdiaphragmatic structures demonstrate no acute  abnormality. Left adrenal nodule measuring approximately 1.6 cm in  size, most likely an adenoma given its low attenuation.    CHEST WALL AND OSSEOUS STRUCTURES:  Changes. No acute process.    Impression  No evidence of a pulmonary embolism.  Mild atelectasis/scarring in the lungs without focal consolidation.    MACRO:  None.    Signed by: Davon Dunn 2/10/2025 7:30 AM  Dictation workstation:   [id number]      Pietro Post MD

## 2025-03-11 ENCOUNTER — APPOINTMENT (OUTPATIENT)
Dept: PHARMACY | Facility: HOSPITAL | Age: 80
End: 2025-03-11
Payer: MEDICARE

## 2025-03-11 DIAGNOSIS — I48.91 ATRIAL FIBRILLATION, UNSPECIFIED TYPE (MULTI): ICD-10-CM

## 2025-03-12 NOTE — PROGRESS NOTES
Subjective   Patient ID: Manasa Goel is a 79 y.o. female who presents for No chief complaint on file..    Referring Provider: Marcelino Hammer DO     HPI    Review of Systems    Objective     There were no vitals taken for this visit.     Labs  Lab Results   Component Value Date    BILITOT 0.4 02/18/2025    CALCIUM 10.3 02/18/2025    CO2 29 02/18/2025     02/18/2025    CREATININE 0.67 02/18/2025    GLUCOSE 79 02/18/2025    ALKPHOS 106 02/18/2025    K 4.4 02/18/2025    PROT 6.8 02/18/2025     02/18/2025    AST 17 02/18/2025    ALT 20 02/18/2025    BUN 19 02/18/2025    ANIONGAP 7 02/18/2025    MG 2.25 02/11/2025    PHOS 3.4 02/11/2025    ALBUMIN 4.6 02/18/2025     Lab Results   Component Value Date    TRIG 124 12/06/2024    CHOL 226 (H) 12/06/2024    LDLCALC 139 (H) 12/06/2024    HDL 62.0 12/06/2024     Lab Results   Component Value Date    HGBA1C 5.2 12/06/2024       Assessment/Plan   Patient's cardiologist decided to discontinue Eliquis. All other medications are affordable at this time. Told patient to call back if anything changes to start process for  PAP. Will follow up as needed.    Follow up as needed    Continue all meds under the continuation of care with the referring provider and clinical pharmacy team.    Micaela Gomez, PharmD

## 2025-03-13 DIAGNOSIS — I47.19 ATRIAL TACHYCARDIA (CMS-HCC): Primary | ICD-10-CM

## 2025-03-13 RX ORDER — VERAPAMIL HCL 240 MG
240 TABLET, EXTENDED RELEASE ORAL DAILY
Qty: 90 TABLET | Refills: 3 | Status: SHIPPED | OUTPATIENT
Start: 2025-03-13 | End: 2026-03-13

## 2025-03-20 ENCOUNTER — PATIENT OUTREACH (OUTPATIENT)
Dept: PRIMARY CARE | Facility: CLINIC | Age: 80
End: 2025-03-20
Payer: MEDICARE

## 2025-03-20 LAB — BODY SURFACE AREA: 1.63 M2

## 2025-03-20 PROCEDURE — 93248 EXT ECG>7D<15D REV&INTERPJ: CPT | Performed by: STUDENT IN AN ORGANIZED HEALTH CARE EDUCATION/TRAINING PROGRAM

## 2025-04-03 ENCOUNTER — OFFICE VISIT (OUTPATIENT)
Dept: PRIMARY CARE | Facility: CLINIC | Age: 80
End: 2025-04-03
Payer: MEDICARE

## 2025-04-03 ENCOUNTER — TELEPHONE (OUTPATIENT)
Dept: PRIMARY CARE | Facility: CLINIC | Age: 80
End: 2025-04-03
Payer: MEDICARE

## 2025-04-03 VITALS
WEIGHT: 131 LBS | DIASTOLIC BLOOD PRESSURE: 64 MMHG | OXYGEN SATURATION: 99 % | SYSTOLIC BLOOD PRESSURE: 122 MMHG | HEART RATE: 59 BPM | BODY MASS INDEX: 23.96 KG/M2 | TEMPERATURE: 97.1 F

## 2025-04-03 DIAGNOSIS — R09.81 NASAL CONGESTION: Primary | ICD-10-CM

## 2025-04-03 PROCEDURE — 99212 OFFICE O/P EST SF 10 MIN: CPT | Performed by: FAMILY MEDICINE

## 2025-04-03 RX ORDER — MINERAL OIL
180 ENEMA (ML) RECTAL DAILY
Qty: 30 TABLET | Refills: 0 | Status: SHIPPED | OUTPATIENT
Start: 2025-04-03 | End: 2026-04-03

## 2025-04-03 RX ORDER — FLUTICASONE PROPIONATE 50 MCG
1 SPRAY, SUSPENSION (ML) NASAL DAILY
Qty: 16 G | Refills: 0 | Status: SHIPPED | OUTPATIENT
Start: 2025-04-03 | End: 2026-04-03

## 2025-04-03 ASSESSMENT — ENCOUNTER SYMPTOMS
DIZZINESS: 0
APPETITE CHANGE: 0
FREQUENCY: 0
FATIGUE: 0
HEADACHES: 0
POLYDIPSIA: 0
POLYPHAGIA: 0
NAUSEA: 0
ARTHRALGIAS: 0
CHEST TIGHTNESS: 0
VOMITING: 0
WEAKNESS: 0
DIARRHEA: 0
NUMBNESS: 0
RHINORRHEA: 1
MYALGIAS: 0
SHORTNESS OF BREATH: 0

## 2025-04-03 NOTE — PROGRESS NOTES
Subjective   Patient ID: Manasa Goel is a 79 y.o. female who presents for Follow-up (TCM , runny nose, dry cough. ).    HPI     Review of Systems   Constitutional:  Negative for appetite change and fatigue.   HENT:  Positive for congestion, postnasal drip and rhinorrhea.    Eyes:  Negative for visual disturbance.   Respiratory:  Negative for chest tightness and shortness of breath.    Cardiovascular:  Negative for chest pain and leg swelling.   Gastrointestinal:  Negative for diarrhea, nausea and vomiting.   Endocrine: Negative for polydipsia, polyphagia and polyuria.   Genitourinary:  Negative for frequency and urgency.   Musculoskeletal:  Negative for arthralgias and myalgias.   Neurological:  Negative for dizziness, syncope, weakness, numbness and headaches.       Objective   /64   Pulse 59   Temp 36.2 °C (97.1 °F)   Wt 59.4 kg (131 lb)   SpO2 99%   BMI 23.96 kg/m²     Physical Exam  HENT:      Nose: Congestion and rhinorrhea present.   Cardiovascular:      Rate and Rhythm: Normal rate.   Pulmonary:      Effort: Pulmonary effort is normal.      Breath sounds: Normal breath sounds.         Assessment/Plan   Problem List Items Addressed This Visit    None  Visit Diagnoses         Codes    Nasal congestion    -  Primary R09.81    Relevant Medications    fluticasone (Flonase) 50 mcg/actuation nasal spray    fexofenadine (Allegra) 180 mg tablet

## 2025-04-03 NOTE — TELEPHONE ENCOUNTER
Pt called and stated that she has the same thing she had last month that ended up in the hospital for.  She would like you to call in the same medication as last time.    Please advise

## 2025-04-07 ENCOUNTER — TELEPHONE (OUTPATIENT)
Dept: PRIMARY CARE | Facility: CLINIC | Age: 80
End: 2025-04-07
Payer: MEDICARE

## 2025-04-07 NOTE — TELEPHONE ENCOUNTER
Pt called and said she did start the new RX last week and she does feel better but she now has a cough and congestion that is keeping her up all night. Other than the cough she seems to be ok.

## 2025-04-15 ENCOUNTER — TELEPHONE (OUTPATIENT)
Dept: PRIMARY CARE | Facility: CLINIC | Age: 80
End: 2025-04-15
Payer: MEDICARE

## 2025-04-15 NOTE — TELEPHONE ENCOUNTER
Pt called and said her swelling in the feet is no longer going down overnight and is swollen most of the time. She is also having tightness in the leg and aching with walking. Suggestions?

## 2025-04-16 ENCOUNTER — OFFICE VISIT (OUTPATIENT)
Dept: PRIMARY CARE | Facility: CLINIC | Age: 80
End: 2025-04-16
Payer: MEDICARE

## 2025-04-16 ENCOUNTER — TELEPHONE (OUTPATIENT)
Dept: PRIMARY CARE | Facility: CLINIC | Age: 80
End: 2025-04-16
Payer: MEDICARE

## 2025-04-16 VITALS
DIASTOLIC BLOOD PRESSURE: 80 MMHG | TEMPERATURE: 96.8 F | RESPIRATION RATE: 17 BRPM | WEIGHT: 137 LBS | HEART RATE: 73 BPM | SYSTOLIC BLOOD PRESSURE: 118 MMHG | HEIGHT: 62 IN | BODY MASS INDEX: 25.21 KG/M2 | OXYGEN SATURATION: 98 %

## 2025-04-16 DIAGNOSIS — M79.604 RIGHT LEG PAIN: Primary | ICD-10-CM

## 2025-04-16 PROCEDURE — 99213 OFFICE O/P EST LOW 20 MIN: CPT | Performed by: FAMILY MEDICINE

## 2025-04-16 RX ORDER — PREDNISONE 20 MG/1
40 TABLET ORAL DAILY
Qty: 10 TABLET | Refills: 0 | Status: SHIPPED | OUTPATIENT
Start: 2025-04-16 | End: 2025-04-21

## 2025-04-16 ASSESSMENT — ENCOUNTER SYMPTOMS
BACK PAIN: 0
NUMBNESS: 0
MYALGIAS: 1
WEAKNESS: 0

## 2025-04-16 NOTE — TELEPHONE ENCOUNTER
Pt called back and stated she never uses salt and she has not tried compression socks.  She also stated her R leg is very tight and painful.    Please advise

## 2025-04-16 NOTE — PROGRESS NOTES
"Subjective   Patient ID: Manasa Goel is a 79 y.o. female who presents for Follow-up (Follow up for swelling in the right leg. Pt states swelling has not improved. And she has not tried compression stockings.  ).    HPI     Review of Systems   Cardiovascular:  Positive for leg swelling.   Musculoskeletal:  Positive for myalgias. Negative for back pain.   Skin:  Negative for rash.   Neurological:  Negative for weakness and numbness.       Objective   /80   Pulse 73   Temp 36 °C (96.8 °F)   Resp 17   Ht 1.575 m (5' 2\")   Wt 62.1 kg (137 lb)   SpO2 98%   BMI 25.06 kg/m²     Physical Exam  Musculoskeletal:      Right lower leg: Swelling and tenderness present.        Legs:          Assessment/Plan   Problem List Items Addressed This Visit    None  Visit Diagnoses         Codes      Right leg pain    -  Primary M79.604    Relevant Medications    predniSONE (Deltasone) 20 mg tablet        Tendonitis in right leg       "

## 2025-04-29 ENCOUNTER — PATIENT OUTREACH (OUTPATIENT)
Dept: PRIMARY CARE | Facility: CLINIC | Age: 80
End: 2025-04-29
Payer: MEDICARE

## 2025-04-29 NOTE — PROGRESS NOTES
90 day post hospital discharge follow up call complete. Pt reports doing well since discharge. Pt last PCP appt 4/16/2025. Pt denies any current needs from PCP.    Pt reports she has all of her medication.    Pt denies any questions, needs, or concerns at this time. Verified pt next PCP appt 12/1/2025.    Pt encouraged to call if questions or needs arise.

## 2025-05-12 DIAGNOSIS — G43.809 OTHER MIGRAINE WITHOUT STATUS MIGRAINOSUS, NOT INTRACTABLE: ICD-10-CM

## 2025-05-12 RX ORDER — BUTALBITAL, ACETAMINOPHEN AND CAFFEINE 50; 325; 40 MG/1; MG/1; MG/1
1 TABLET ORAL EVERY 6 HOURS PRN
Qty: 60 TABLET | Refills: 0 | Status: SHIPPED | OUTPATIENT
Start: 2025-05-12

## 2025-05-12 NOTE — TELEPHONE ENCOUNTER
Patient requests prescription below    Last Office Visit: 4/16/2025   Next Office Visit: Visit date not found     Requested Prescriptions     Pending Prescriptions Disp Refills    butalbital-acetaminophen-caff -40 mg tablet 60 tablet 1     Sig: Take 1 tablet by mouth every 6 hours if needed for headaches.

## 2025-05-28 ENCOUNTER — OFFICE VISIT (OUTPATIENT)
Dept: PRIMARY CARE | Facility: CLINIC | Age: 80
End: 2025-05-28
Payer: MEDICARE

## 2025-05-28 VITALS
HEART RATE: 64 BPM | BODY MASS INDEX: 24.66 KG/M2 | WEIGHT: 134 LBS | DIASTOLIC BLOOD PRESSURE: 64 MMHG | TEMPERATURE: 96.8 F | OXYGEN SATURATION: 99 % | SYSTOLIC BLOOD PRESSURE: 112 MMHG | RESPIRATION RATE: 16 BRPM | HEIGHT: 62 IN

## 2025-05-28 DIAGNOSIS — K11.20 PAROTID SIALADENITIS: Primary | ICD-10-CM

## 2025-05-28 PROCEDURE — 99213 OFFICE O/P EST LOW 20 MIN: CPT | Performed by: FAMILY MEDICINE

## 2025-05-28 RX ORDER — AMOXICILLIN AND CLAVULANATE POTASSIUM 875; 125 MG/1; MG/1
875 TABLET, FILM COATED ORAL 2 TIMES DAILY
Qty: 14 TABLET | Refills: 0 | Status: SHIPPED | OUTPATIENT
Start: 2025-05-28 | End: 2025-06-04

## 2025-05-28 RX ORDER — PREDNISONE 20 MG/1
40 TABLET ORAL DAILY
Qty: 10 TABLET | Refills: 0 | Status: SHIPPED | OUTPATIENT
Start: 2025-05-28 | End: 2025-06-02

## 2025-05-28 ASSESSMENT — ENCOUNTER SYMPTOMS
PALPITATIONS: 0
ABDOMINAL PAIN: 0
SINUS PAIN: 0
RHINORRHEA: 0
CHILLS: 0
SINUS PRESSURE: 0
FACIAL SWELLING: 1
FEVER: 0
SORE THROAT: 0
SHORTNESS OF BREATH: 0
TROUBLE SWALLOWING: 1

## 2025-05-28 NOTE — PROGRESS NOTES
"Subjective   Patient ID: Manasa Goel is a 79 y.o. female who presents for Mass (Pt is here for a mass on the left side of her cheek on the jaw. She states she noticed the lump this morning. She is having pain, swelling. No redness or drainage. She also states she is having trouble chewing food and swallowing.    ).    HPI     Review of Systems   Constitutional:  Negative for chills and fever.   HENT:  Positive for facial swelling and trouble swallowing. Negative for dental problem, drooling, ear pain, postnasal drip, rhinorrhea, sinus pressure, sinus pain and sore throat.    Respiratory:  Negative for shortness of breath.    Cardiovascular:  Negative for chest pain, palpitations and leg swelling.   Gastrointestinal:  Negative for abdominal pain.   Skin:  Negative for rash.       Objective   /64   Pulse 64   Temp 36 °C (96.8 °F)   Resp 16   Ht 1.575 m (5' 2\")   Wt 60.8 kg (134 lb)   SpO2 99%   BMI 24.51 kg/m²     Physical Exam  HENT:      Head:      Salivary Glands: Left salivary gland is diffusely enlarged and tender.        Comments: Left parotid swelling  Cardiovascular:      Rate and Rhythm: Normal rate and regular rhythm.   Pulmonary:      Effort: Pulmonary effort is normal.         Assessment/Plan          "

## 2025-06-04 DIAGNOSIS — J06.9 URI, ACUTE: ICD-10-CM

## 2025-06-04 DIAGNOSIS — J96.01 ACUTE HYPOXIC RESPIRATORY FAILURE: ICD-10-CM

## 2025-06-04 DIAGNOSIS — R09.81 NASAL CONGESTION: ICD-10-CM

## 2025-06-04 RX ORDER — FLUTICASONE PROPIONATE 50 MCG
1 SPRAY, SUSPENSION (ML) NASAL DAILY
Qty: 16 G | Refills: 3 | Status: SHIPPED | OUTPATIENT
Start: 2025-06-04 | End: 2026-06-04

## 2025-06-04 RX ORDER — ALBUTEROL SULFATE 90 UG/1
2 INHALANT RESPIRATORY (INHALATION) EVERY 4 HOURS PRN
Qty: 8.5 G | Refills: 3 | Status: SHIPPED | OUTPATIENT
Start: 2025-06-04

## 2025-06-04 NOTE — TELEPHONE ENCOUNTER
Requested Prescriptions     Pending Prescriptions Disp Refills    fluticasone (Flonase) 50 mcg/actuation nasal spray 16 g 0     Sig: Administer 1 spray into each nostril once daily. Shake gently. Before first use, prime pump. After use, clean tip and replace cap.    albuterol (Ventolin HFA) 90 mcg/actuation inhaler 8.5 g 0     Sig: Inhale 2 puffs every 4 hours if needed for wheezing or shortness of breath.

## 2025-06-05 NOTE — TELEPHONE ENCOUNTER
----- Message from Marcelino Hammer sent at 6/4/2025  4:17 PM EDT -----  See message  ----- Message -----  From: Marcelino Hammer DO  Sent: 6/4/2025  12:00 AM EDT  To: Marcelino Hammer DO    How is salivary gland infection

## 2025-07-14 ENCOUNTER — TELEPHONE (OUTPATIENT)
Dept: PRIMARY CARE | Facility: CLINIC | Age: 80
End: 2025-07-14
Payer: MEDICARE

## 2025-07-15 DIAGNOSIS — G43.809 OTHER MIGRAINE WITHOUT STATUS MIGRAINOSUS, NOT INTRACTABLE: ICD-10-CM

## 2025-07-15 RX ORDER — BUTALBITAL, ACETAMINOPHEN AND CAFFEINE 50; 325; 40 MG/1; MG/1; MG/1
1 TABLET ORAL EVERY 6 HOURS PRN
Qty: 60 TABLET | Refills: 0 | Status: SHIPPED | OUTPATIENT
Start: 2025-07-15

## 2025-07-15 NOTE — TELEPHONE ENCOUNTER
Patient requests prescription below    Last Office Visit: 5/28/2025   Next Office Visit: 12/1/2025     Requested Prescriptions     Pending Prescriptions Disp Refills    butalbital-acetaminophen-caff -40 mg tablet 60 tablet 0     Sig: Take 1 tablet by mouth every 6 hours if needed for headaches.

## 2025-08-08 ENCOUNTER — APPOINTMENT (OUTPATIENT)
Dept: ORTHOPEDIC SURGERY | Facility: CLINIC | Age: 80
End: 2025-08-08
Payer: MEDICARE

## 2025-08-08 ENCOUNTER — HOSPITAL ENCOUNTER (OUTPATIENT)
Dept: RADIOLOGY | Facility: CLINIC | Age: 80
Discharge: HOME | End: 2025-08-08
Payer: MEDICARE

## 2025-08-08 DIAGNOSIS — M79.641 PAIN IN BOTH HANDS: ICD-10-CM

## 2025-08-08 DIAGNOSIS — M79.642 LEFT HAND PAIN: ICD-10-CM

## 2025-08-08 DIAGNOSIS — M79.642 PAIN IN BOTH HANDS: ICD-10-CM

## 2025-08-08 PROCEDURE — 73130 X-RAY EXAM OF HAND: CPT | Mod: 50

## 2025-08-08 RX ORDER — LIDOCAINE HYDROCHLORIDE 10 MG/ML
0.5 INJECTION, SOLUTION INFILTRATION; PERINEURAL
Status: COMPLETED | OUTPATIENT
Start: 2025-08-08 | End: 2025-08-08

## 2025-08-08 RX ADMIN — LIDOCAINE HYDROCHLORIDE 0.5 ML: 10 INJECTION, SOLUTION INFILTRATION; PERINEURAL at 12:30

## 2025-08-08 NOTE — PROGRESS NOTES
History of Present Illness:  Presents for evaluation of left wrist pain.  Patient denies inciting trauma.  The pain is localized about the distal ulna.  It is described as moderate. The pain occurs intermittantly. The patient presents due to persistent symptoms even with activity modification. Patient states that approximately 6 to 7 years ago she fell on ice and had a wrist fracture that was treated nonoperatively.       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  Injection 3/12/24 and 10/11/2024 with relief.     Review of Systems   GENERAL: Negative for malaise, significant weight loss, fever  MUSCULOSKELETAL: see HPI  NEURO:  Negative    The patient's past medical history, family history, social history, and review of systems were reviewed. History is otherwise negative except as stated in the HPI.    Physical Examination:  General: Alert and oriented to person, place, and time.  No acute distress and breathing comfortably: Pleasant and cooperative with examination.  HEENT: Head is normocephalic and atraumatic.  Neck: Supple, no visible swelling.  Cardiovascular: No palpable tachycardia  Lungs: No audible wheezing or labored breathing  Abdomen: Nondistended.  On musculoskeletal examination, the patient has full elbow range of motion. In regards to the wrist, there is no obvious deformity. Range of motion is full in flexion, extension, pronation, and supination. Strength is 5/5 in flexion and extension. There is tenderness to palpation of the left distal ulna.  There is no tenderness to palpation about the thumb cmc, 1st dorsal compartment, the SL interval, or the TFCC. Sensation and motor function are intact in the radial, ulnar, and median nerve distribution. There is no obvious thenar or intrinsic atrophy. All fingers are without triggering and are without pain over the A1 pulley. The patient can make a full composite fist. The hand itself is warm and well perfused. The skin is intact throughout.  The contralateral hand and wrist are normal to inspection, range of motion, stability, and strength.    Imaging:  AP, lateral, and oblique radiographs of the bilateral hands were reviewed. 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    M Inj/Asp: L distal radioulnar on 8/8/2025 12:30 PM  Indications: pain and joint swelling  Details: 25 G needle, dorsal approach  Medications: 5 mg triamcinolone acetonide 10 mg/mL; 0.5 mL lidocaine 10 mg/mL (1 %)  Outcome: tolerated well, no immediate complications  Procedure, treatment alternatives, risks and benefits explained, specific risks discussed. Consent was given by the patient. Immediately prior to procedure a time out was called to verify the correct patient, procedure, equipment, support staff and site/side marked as required. Patient was prepped and draped in the usual sterile fashion.             Assessment:  Patient with left wrist arthritis. DRUJ arthritis.     Plan:   Injection. I had a long discussion with the patient regarding the diagnosis of hand/wrist arthritis and the risks/benefits/expected outcomes of various treatment options. At this point, the patient elected non-operative treatment consisting of activity modification, intermittant NSAIDs (if not medically contraindicated), and/or  splinting. I also discussed the option of a corticosteroid injection. Specifically, I reviewed the risks of injection which include, but are not limited to, infection, bleeding, pain, steroid flare, glycemic alteration, subcutaneous fat atrophy, skin hypopigmentation, soft tissue damage, and incomplete symptom relief. The patient consented to the injection, and then using sterile technique, I injected a 1mL of Kenalog 40 into the left DRUJ arthritis. The injection site was dressed, and the patient tolerated the injection well. Finally, I have emphasized patience, as any benefit may take some time to manifest.  Depending on the success of this non-operative course, I will see them back on an as needed basis.        Marie Harp MD  Orthopaedic Surgeon

## 2025-09-11 ENCOUNTER — APPOINTMENT (OUTPATIENT)
Dept: CARDIOLOGY | Facility: CLINIC | Age: 80
End: 2025-09-11
Payer: MEDICARE

## 2025-10-10 ENCOUNTER — APPOINTMENT (OUTPATIENT)
Dept: ORTHOPEDIC SURGERY | Facility: CLINIC | Age: 80
End: 2025-10-10
Payer: MEDICARE

## 2025-12-01 ENCOUNTER — APPOINTMENT (OUTPATIENT)
Dept: PRIMARY CARE | Facility: CLINIC | Age: 80
End: 2025-12-01
Payer: MEDICARE